# Patient Record
Sex: FEMALE | Race: WHITE | NOT HISPANIC OR LATINO | Employment: UNEMPLOYED | URBAN - METROPOLITAN AREA
[De-identification: names, ages, dates, MRNs, and addresses within clinical notes are randomized per-mention and may not be internally consistent; named-entity substitution may affect disease eponyms.]

---

## 2023-03-08 LAB
ANION GAP IN SER/PLAS: 13 MMOL/L (ref 10–20)
CALCIUM (MG/DL) IN SER/PLAS: 9.1 MG/DL (ref 8.6–10.6)
CARBON DIOXIDE, TOTAL (MMOL/L) IN SER/PLAS: 27 MMOL/L (ref 21–32)
CHLORIDE (MMOL/L) IN SER/PLAS: 103 MMOL/L (ref 98–107)
CREATININE (MG/DL) IN SER/PLAS: 0.76 MG/DL (ref 0.5–1.05)
ERYTHROCYTE DISTRIBUTION WIDTH (RATIO) BY AUTOMATED COUNT: 12.7 % (ref 11.5–14.5)
ERYTHROCYTE MEAN CORPUSCULAR HEMOGLOBIN CONCENTRATION (G/DL) BY AUTOMATED: 32.6 G/DL (ref 32–36)
ERYTHROCYTE MEAN CORPUSCULAR VOLUME (FL) BY AUTOMATED COUNT: 89 FL (ref 80–100)
ERYTHROCYTES (10*6/UL) IN BLOOD BY AUTOMATED COUNT: 4.36 X10E12/L (ref 4–5.2)
GFR FEMALE: >90 ML/MIN/1.73M2
GLUCOSE (MG/DL) IN SER/PLAS: 95 MG/DL (ref 74–99)
HEMATOCRIT (%) IN BLOOD BY AUTOMATED COUNT: 38.6 % (ref 36–46)
HEMOGLOBIN (G/DL) IN BLOOD: 12.6 G/DL (ref 12–16)
LEUKOCYTES (10*3/UL) IN BLOOD BY AUTOMATED COUNT: 3.2 X10E9/L (ref 4.4–11.3)
NRBC (PER 100 WBCS) BY AUTOMATED COUNT: 0 /100 WBC (ref 0–0)
PLATELETS (10*3/UL) IN BLOOD AUTOMATED COUNT: 175 X10E9/L (ref 150–450)
POTASSIUM (MMOL/L) IN SER/PLAS: 4 MMOL/L (ref 3.5–5.3)
SODIUM (MMOL/L) IN SER/PLAS: 139 MMOL/L (ref 136–145)
UREA NITROGEN (MG/DL) IN SER/PLAS: 12 MG/DL (ref 6–23)

## 2023-04-13 ENCOUNTER — OFFICE VISIT (OUTPATIENT)
Dept: PRIMARY CARE | Facility: CLINIC | Age: 33
End: 2023-04-13
Payer: COMMERCIAL

## 2023-04-13 VITALS
BODY MASS INDEX: 25.06 KG/M2 | TEMPERATURE: 97.5 F | OXYGEN SATURATION: 98 % | DIASTOLIC BLOOD PRESSURE: 74 MMHG | HEIGHT: 71 IN | WEIGHT: 179 LBS | SYSTOLIC BLOOD PRESSURE: 114 MMHG | HEART RATE: 69 BPM

## 2023-04-13 DIAGNOSIS — M89.9 LESION OF BONE OF WRIST: Primary | ICD-10-CM

## 2023-04-13 PROBLEM — C53.9 PRIMARY CERVICAL CANCER (MULTI): Status: ACTIVE | Noted: 2022-04-12

## 2023-04-13 PROBLEM — D64.9 ANEMIA: Status: ACTIVE | Noted: 2023-04-13

## 2023-04-13 PROCEDURE — 99213 OFFICE O/P EST LOW 20 MIN: CPT | Performed by: INTERNAL MEDICINE

## 2023-04-13 PROCEDURE — 1036F TOBACCO NON-USER: CPT | Performed by: INTERNAL MEDICINE

## 2023-04-13 RX ORDER — NORETHINDRONE ACETATE AND ETHINYL ESTRADIOL, AND FERROUS FUMARATE 1.5-30(21)
1 KIT ORAL DAILY
COMMUNITY
End: 2024-04-11 | Stop reason: ALTCHOICE

## 2023-04-13 NOTE — PROGRESS NOTES
"The patient is here for: lump on left wrist. No pain.    I have reviewed existing histories, notes, past medical history, surgical history, social history, family history, med list, allergy list, and immunization, and updated if applicable.    Patient's PCP is: Josh Lucero MD    HPI:     For several weeks, noted hard bump on left wrist. No pain.  Not changed.    Additional concerns and ROS:   None      above pcp is her gynecology oncologist. finished tx (chemo and radiation therapy), for cervical cancer in July.   feeling well  did feel really tired during treatment.  some anemia noted.    not vegetarian.  she does not smoke or drink  she stays home.  her  is a retinal specialist at     Physical exam:  /74   Pulse 69   Temp 36.4 °C (97.5 °F)   Ht 1.791 m (5' 10.5\")   Wt 81.2 kg (179 lb)   SpO2 98%   BMI 25.32 kg/m²     Jorge Luis eft distal radius bone, there is a firm cyst/bony structure, no pain or erythematous. PPP  No focal neurologic deficit      Assessments/orders:   1. Lesion of bone of wrist   Firm nodule over the bone, ?ganglion cyst vs bone lesion?  - XR wrist left 3+ views; Future  - XR wrist left 3+ views      Plan/discussion and follow up:      Follow up prn           "

## 2023-04-25 ENCOUNTER — TELEPHONE (OUTPATIENT)
Dept: PRIMARY CARE | Facility: CLINIC | Age: 33
End: 2023-04-25
Payer: COMMERCIAL

## 2023-04-25 DIAGNOSIS — M67.432 GANGLION CYST OF DORSUM OF LEFT WRIST: Primary | ICD-10-CM

## 2023-10-06 NOTE — PROGRESS NOTES
".Patient ID: Clotilde Connor is a 33 y.o. female.  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   4 cm lesion with full thickness cervical involvement and early parametrial invasion.    PET/CT performed mid-cranium to midthighs on 3/31/2022:  No extracervical abnormal findings.        Underwent chemo-radiation therapy:  Dr. Rushing at the [facility]:  ChemoRT.  EBRT.  4500 cGy 25 fractions.  Dr. Erasmo Howe, Brachytherapy.  4 fractions, 2800 cGy.  Completed 7/8/2022.    No ovarian transposition.    Did have embryos harvested in [place] prior to chemoRT.    Treatment history:  3/7/2022 Excisional procedure  Final pathology:  2.6 x 2.3 x 2.0 cm specimen submitted.  Invasive carcinoma diagnosed,  most consistent with adenosquamous carcinoma.  Tumor measures 2.6 cm grossly, with \"circumferential involvement of the cone specimen\".  Depth of invasion is at least 8 mm.  Perineural invasion noted as were findings suspicious for LVSI.    ECC:  Non-diagnostic.  3/31/2022 PET/CT No extracervical abnormal findings.      Reported \"indistinct 3.6  x 4.2 cm enhancement and thickening with associated FDG uptake.    ChemoRT.  EBRT.  4500 cGy 25 fractions. in [place].   Completed 6/2022  - 7/8/2022  HDR BT 4 fractions, 2800 cGy  3/13/23 EUA cervical biopsy, ECC radiation changes, no evidence of malignancy  PMHx: None  PSHx: None      Objective    There were no vitals taken for this visit.     Interval history:  Patient is a 33 year old female with stage IIB adenosquamous carcinoma of the cervix s/p ChemoRT,  EBRT, 4500 cGy 25 fractions and s/p 4 fractions brachytherapy.  Completed 7/8/2022.  3/13/23 EUA cervical biopsy, ECC radiation changes, no evidence of malignancy.  Last pap 7/2023 was negative, HPV-.  Patient states she has been taking OCPs, having periods monthly, still not quite regular intervals.  Patient states she has been working out daily, " cycling and hiking.  Patient states the last two months she will have random shortness of breath, not necessarily related to exercise.  Patient is currently sexually active, has some dryness and bleeding after sex.  Denies bowel or bladder issues.           Physical Exam:    Constitutional: Doing well. MELISSA  Eyes: PERRL  ENMT: Moist mucus membranes  Head/Neck: Supple. Symmetrical  Cardiovascular: Regular, rate and rhythm. 2+ equal pulses of the extremities  Respiratory/Thorax: CTA. RRR. Chest rise symmetrical.  Gastrointestinal: Non-distended, soft, non-tender  Genitourinary: Cervical borders obliterated, radiation atrophy changes to tissue, no lesions noted.   Musculoskeletal: ROM intact, no joint swelling, normal strength  Extremities: No edema  Neurological: Alert and oriented x 3. Pleasant and cooperative.  Lymphatic: No lymphadenopathy. No lymphedema  Psychological: Appropriate mood and behavior  Skin: Warm and dry, no lesions, no rashes    A complete review of systems was performed and all systems were normal except what is noted in the interval history.          Assessment/Plan   Patient is a 33 year old female with stage IIB adenosquamous carcinoma of the cervix s/p ChemoRT,  EBRT, 4500 cGy 25 fractions and s/p 4 fractions brachytherapy.  Completed 7/8/2022.  Radiation atrophy.         Plan:     RX Estrace sent  Refill OCPS as needed  F/U in 3 months or as needed  Call PCP if dyspnea episodes occur more often

## 2023-10-07 PROBLEM — R22.32 MASS OF LEFT WRIST: Status: ACTIVE | Noted: 2023-10-07

## 2023-10-10 ENCOUNTER — OFFICE VISIT (OUTPATIENT)
Dept: GYNECOLOGIC ONCOLOGY | Facility: CLINIC | Age: 33
End: 2023-10-10
Payer: COMMERCIAL

## 2023-10-10 VITALS
HEART RATE: 78 BPM | DIASTOLIC BLOOD PRESSURE: 76 MMHG | TEMPERATURE: 97.3 F | SYSTOLIC BLOOD PRESSURE: 121 MMHG | BODY MASS INDEX: 26.54 KG/M2 | RESPIRATION RATE: 16 BRPM | WEIGHT: 187.61 LBS | OXYGEN SATURATION: 98 %

## 2023-10-10 DIAGNOSIS — C53.9 MALIGNANT NEOPLASM OF CERVIX, UNSPECIFIED SITE (MULTI): ICD-10-CM

## 2023-10-10 DIAGNOSIS — N93.0 PCB (POST COITAL BLEEDING): Primary | ICD-10-CM

## 2023-10-10 DIAGNOSIS — T66.XXXA RADIATION INJURY, INITIAL ENCOUNTER: ICD-10-CM

## 2023-10-10 PROCEDURE — 99214 OFFICE O/P EST MOD 30 MIN: CPT | Performed by: NURSE PRACTITIONER

## 2023-10-10 PROCEDURE — 1036F TOBACCO NON-USER: CPT | Performed by: NURSE PRACTITIONER

## 2023-10-10 RX ORDER — ESTRADIOL 0.1 MG/G
CREAM VAGINAL
Qty: 42.5 G | Refills: 3 | Status: SHIPPED
Start: 2023-10-10 | End: 2023-11-02 | Stop reason: ALTCHOICE

## 2023-10-10 ASSESSMENT — PAIN SCALES - GENERAL: PAINLEVEL: 0-NO PAIN

## 2023-10-10 ASSESSMENT — PATIENT HEALTH QUESTIONNAIRE - PHQ9
SUM OF ALL RESPONSES TO PHQ QUESTIONS 1-9: 0
1. LITTLE INTEREST OR PLEASURE IN DOING THINGS: 0
SUM OF ALL RESPONSES TO PHQ9 QUESTIONS 1 AND 2: 0
5. POOR APPETITE OR OVEREATING: NOT AT ALL
8. MOVING OR SPEAKING SO SLOWLY THAT OTHER PEOPLE COULD HAVE NOTICED. OR THE OPPOSITE, BEING SO FIGETY OR RESTLESS THAT YOU HAVE BEEN MOVING AROUND A LOT MORE THAN USUAL: NOT AT ALL
8. MOVING OR SPEAKING SO SLOWLY THAT OTHER PEOPLE COULD HAVE NOTICED. OR THE OPPOSITE, BEING SO FIGETY OR RESTLESS THAT YOU HAVE BEEN MOVING AROUND A LOT MORE THAN USUAL: NOT AT ALL
1. LITTLE INTEREST OR PLEASURE IN DOING THINGS: NOT AT ALL
2. FEELING DOWN, DEPRESSED OR HOPELESS: NOT AT ALL
9. THOUGHTS THAT YOU WOULD BE BETTER OFF DEAD, OR OF HURTING YOURSELF: 0
10. IF YOU CHECKED OFF ANY PROBLEMS, HOW DIFFICULT HAVE THESE PROBLEMS MADE IT FOR YOU TO DO YOUR WORK, TAKE CARE OF THINGS AT HOME, OR GET ALONG WITH OTHER PEOPLE: NOT DIFFICULT AT ALL
7. TROUBLE CONCENTRATING ON THINGS, SUCH AS READING THE NEWSPAPER OR WATCHING TELEVISION: NOT AT ALL
7. TROUBLE CONCENTRATING ON THINGS, SUCH AS READING THE NEWSPAPER OR WATCHING TELEVISION: NOT AT ALL
SUM OF ALL RESPONSES TO PHQ QUESTIONS 1-9: 0
6. FEELING BAD ABOUT YOURSELF - OR THAT YOU ARE A FAILURE OR HAVE LET YOURSELF OR YOUR FAMILY DOWN: NOT AT ALL
3. TROUBLE FALLING OR STAYING ASLEEP OR SLEEPING TOO MUCH: NOT AT ALL
3. TROUBLE FALLING OR STAYING ASLEEP OR SLEEPING TOO MUCH: NOT AT ALL
1. LITTLE INTEREST OR PLEASURE IN DOING THINGS: NOT AT ALL
4. FEELING TIRED OR HAVING LITTLE ENERGY: 0
4. FEELING TIRED OR HAVING LITTLE ENERGY: NOT AT ALL
9. THOUGHTS THAT YOU WOULD BE BETTER OFF DEAD, OR OF HURTING YOURSELF: NOT AT ALL
10. IF YOU CHECKED OFF ANY PROBLEMS, HOW DIFFICULT HAVE THESE PROBLEMS MADE IT FOR YOU TO DO YOUR WORK, TAKE CARE OF THINGS AT HOME, OR GET ALONG WITH OTHER PEOPLE: NOT DIFFICULT AT ALL
2. FEELING DOWN, DEPRESSED, IRRITABLE, OR HOPELESS: NOT AT ALL
6. FEELING BAD ABOUT YOURSELF - OR THAT YOU ARE A FAILURE OR HAVE LET YOURSELF OR YOUR FAMILY DOWN: NOT AT ALL
8. MOVING OR SPEAKING SO SLOWLY THAT OTHER PEOPLE COULD HAVE NOTICED. OR THE OPPOSITE, BEING SO FIGETY OR RESTLESS THAT YOU HAVE BEEN MOVING AROUND A LOT MORE THAN USUAL: 0
2. FEELING DOWN, DEPRESSED, IRRITABLE, OR HOPELESS: 0
6. FEELING BAD ABOUT YOURSELF - OR THAT YOU ARE A FAILURE OR HAVE LET YOURSELF OR YOUR FAMILY DOWN: 0
5. POOR APPETITE OR OVEREATING: NOT AT ALL
5. POOR APPETITE OR OVEREATING: 0
3. TROUBLE FALLING OR STAYING ASLEEP OR SLEEPING TOO MUCH: 0
1. LITTLE INTEREST OR PLEASURE IN DOING THINGS: NOT AT ALL
7. TROUBLE CONCENTRATING ON THINGS, SUCH AS READING THE NEWSPAPER OR WATCHING TELEVISION: 0
4. FEELING TIRED OR HAVING LITTLE ENERGY: NOT AT ALL
9. THOUGHTS THAT YOU WOULD BE BETTER OFF DEAD, OR OF HURTING YOURSELF: NOT AT ALL
2. FEELING DOWN, DEPRESSED OR HOPELESS: NOT AT ALL

## 2023-10-10 ASSESSMENT — ENCOUNTER SYMPTOMS
DEPRESSION: 0
LOSS OF SENSATION IN FEET: 0
OCCASIONAL FEELINGS OF UNSTEADINESS: 0

## 2023-10-10 ASSESSMENT — COLUMBIA-SUICIDE SEVERITY RATING SCALE - C-SSRS
6. HAVE YOU EVER DONE ANYTHING, STARTED TO DO ANYTHING, OR PREPARED TO DO ANYTHING TO END YOUR LIFE?: NO
2. HAVE YOU ACTUALLY HAD ANY THOUGHTS OF KILLING YOURSELF?: NO

## 2023-10-11 ENCOUNTER — DOCUMENTATION (OUTPATIENT)
Dept: GYNECOLOGIC ONCOLOGY | Facility: CLINIC | Age: 33
End: 2023-10-11
Payer: COMMERCIAL

## 2023-11-02 ENCOUNTER — CONSULT (OUTPATIENT)
Dept: ENDOCRINOLOGY | Facility: CLINIC | Age: 33
End: 2023-11-02
Payer: COMMERCIAL

## 2023-11-02 VITALS
DIASTOLIC BLOOD PRESSURE: 85 MMHG | HEIGHT: 70 IN | HEART RATE: 80 BPM | SYSTOLIC BLOOD PRESSURE: 130 MMHG | BODY MASS INDEX: 27.49 KG/M2 | WEIGHT: 192 LBS

## 2023-11-02 DIAGNOSIS — Z31.41 FERTILITY TESTING: ICD-10-CM

## 2023-11-02 DIAGNOSIS — Z12.4 CERVICAL CANCER SCREENING: ICD-10-CM

## 2023-11-02 DIAGNOSIS — Z31.69 ENCOUNTER FOR PRECONCEPTION CONSULTATION: Primary | ICD-10-CM

## 2023-11-02 PROCEDURE — 99215 OFFICE O/P EST HI 40 MIN: CPT | Performed by: OBSTETRICS & GYNECOLOGY

## 2023-11-02 ASSESSMENT — LIFESTYLE VARIABLES
HISTORY_ALCOHOL_USE: NO
TOBACCO_USE: NO

## 2023-11-02 ASSESSMENT — PAIN SCALES - GENERAL: PAINLEVEL: 0-NO PAIN

## 2023-11-02 NOTE — PROGRESS NOTES
In Person    NEW FERTILITY PATIENT VISIT    Referred by:    Accompanied today by: Who is accompanying you to your visit:: Aftab Connor is a 33 y.o.  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  Completed 2022.  Currently in remission    IVF cycle in [place]- 24 eggs retrieved, 4 blastocysts frozen and PGT tested --> 1 euploid blastocyst frozen    Prior Labs  FSH 2.7  LH 0.6  E2 <19  -All of the above on OCP     OB Hx     OB History          2    Para   1    Term                AB        Living             SAB        IAB        Ectopic        Multiple        Live Births   1               OB Hx:  2014-  full term, no complications  - Ruptured ectopic, right tube s/p R salpingectomy (conceived with Mirena IUD in place)- had left tubal ligation at that time.  -Both from previous partner, now  from him    MENSTRUAL HISTORY  LMP:  On OCP- - 10/5/2023  Menarche: If applicable, when was your first occurrence of menstruation?: 2023  -Amenorrhea after cessation of treatment until starting OCP in 2022  Contraception: What (if any) type of birth control do you currently use?: Maria Esther Fe  Cycle length: What is the average number of days between menstrual cycles?: 30  Describe your bleeding: Describe your  bleeding:: Light  Dysmenorrhea: Are your menstrual periods painful?: No     ENDOCRINE/INFERTILITY HISTORY  Duration of infertility: If applicable, what is the approximate date you began trying to get pregnant?: Not applicable  Coital Activity/week:    Nipple Discharge: Do you experience any loss of milk or liquid discharge from the breasts?: No  Vision changes: Are you experiencing any vision changes?: No  Headaches: Are you experiencing headaches?: No  Excess hair growth: Are you experiencing persistent or worsening hair growth on the face, breasts or lower abdomen?: No  Excessive hair loss: Are you experiencing loss of hair from your scalp?: No  Acne: Are you experiencing acne?: No  Oily skin: Oily skin?: No  Recent weight change  Weight gain: Are you experiencing any increase in weight?: Yes  Weight loss: Are you experiencing any decrease in weight?: No  Exercise more than 3 times a week: Exercise more than 3 times a week?: Yes      GYN HISTORY   Have you ever been diagnosed with a sexually transmitted disease? Have you ever been diagnosed with a sexually transmitted disease?: No  Please select all that are applicable:    Have you ever had Pelvic Inflammatory Disease? Have you ever had Pelvic Inflammatory Disease?: No  Have you had an abnormal PAP smear? Have you had an abnormal PAP smear?: Yes  Date & Result of last PAP smear: S/p cervical cancer treatment, in remission  Have you ever had an abnormal Mammogram? Have you ever had an abnormal mammogram?: No  Date & result of your last mammogram:    Do you have pelvic pain? Do you have pelvic pain?: No  How many times per week do you have intercourse?    Do you have pain with intercourse? Do you have pain with intercourse?: No  Do you use lubricants with intercourse?    Do you have pain with bowel movements? Do you have pain with bowel movements?: Yes  -Result of radiation treatment          Do you have pain with a full bladder? Do you have pain with a full  bladder?: No      PMH  Past Medical History:   Diagnosis Date    Cervical cancer (CMS/[facility])         MEDICATIONS  Current Outpatient Medications on File Prior to Visit   Medication Sig Dispense Refill    Junel FE 1., 28, 1.5 mg-30 mcg (21)/75 mg (7) tablet Take 1 tablet by mouth once daily. as directed      [DISCONTINUED] estradiol (Estrace) 0.01 % (0.1 mg/gram) vaginal cream Dime to nickel size amount on your finger and apply to directed area.  3 nights a week at bedtime 42.5 g 3     No current facility-administered medications on file prior to visit.        PSH  Past Surgical History:   Procedure Laterality Date    CERVICAL BIOPSY      ECTOPIC PREGNANCY SURGERY      OVARY SURGERY          PSYCH HISTORY  Have you ever been diagnosed with a mental health Issue?: No    Have you ever been hospitalized for a mental health disorder?: No       SOCIAL HISTORY  Social History     Tobacco Use    Smoking status: Never    Smokeless tobacco: Never   Substance Use Topics    Alcohol use: Never    Drug use: Never     Occupation: Your occupation:: Housewife    Have you ever been incarcerated? Have you ever been incarcerated?: No  Do you have a history of domestic violence? Do you have a history of domestic violence?: No  Do you feel safe at home? Do you feel safe at home?: Yes  Do you have a history of any negative sexual experience such as incest or rape? Do you have a history of any negative sexual experience such as incest or rape?: No       PARTNER HISTORY  Partner Name: Partner name:: Aftab Prince  : Partner :: 08/10/84  Occupation: Partner occupation:: Retina Specialist and Ocular Oncolgist  Prior fertility history: Partner Prior Fertility History:: Healthy  PMH:   None  PSH:   None  Smoking:Partner: Recent or current tobacco use: No  Alcohol Use: Partner: Recent or current alcohol use: No  Drug Use: Partner: Recent or current drug use: No  Medications:    Injuries: Partner: Any history of surgeries or injuries in  "the reproductive area?: No  STD: Have you ever been diagnosed with a sexually transmitted disease?: No  Please select all that are applicable:    SA: Prior Semen Analysis completed?: No    SA Results:   Had normal SA as part of IVF treatment in [place]    FAMILY HISTORY  Family History   Problem Relation Name Age of Onset    Diabetes Father      Hypertension Father      Breast cancer Paternal Grandmother         CANCER HISTORY    Breast: Breast Cancer: Please answer Yes, No or Unsure. If Yes, please, identify which family member.: Paternal Grandmother  Ovarian:    Colon:    Endometrial:      FAMILY VTE HISTORY  Family History of Blood Clots:  None    GENETIC HISTORY  Ethnic Background  Patient: Ethnic Background Patient:: White  Partner: Ethnic Background Partner:: [place]  Genetic Disease in Family  Patient: Patient: Defects and genetic syndromes? Please answer Yes, No or Unsure: No  Partner: Partner: Defects and genetic syndromes? Please answer Yes, No or Unsure: No  Birth Defects in Family  Patient: Patient: Birth defects? Please answer Yes, No or Unsure: No  Partner: Partner: Birth defects? Please answer Yes, No or Unsure: No  Genetic screening performed previously:   Believes she had screening done as part of IVF treatment- cannot remember which company and was told normal     BMI:   BMI Readings from Last 1 Encounters:   23 27.55 kg/m²     VITALS:  /85 (BP Location: Right arm, Patient Position: Sitting, BP Cuff Size: Adult)   Pulse 80   Ht 1.778 m (5' 10\")   Wt 87.1 kg (192 lb)   LMP 10/05/2023 (Exact Date)   BMI 27.55 kg/m²     ASSESSMENT   33 y.o.  female with secondary  infertility (tubal factor s/p Right salpingectomy, L tubal ligation), history of cervical cancer s/p radiation, desires FET with euploid embryo currently stored at [facility].  Currently on OCP with monthly menses (no assessment of menopausal status)  Partner SA: Normal    COUNSELING  We discussed causes of infertility " including hormonal, egg quality issues, structural problems such as endometriosis, adhesions, or tubal problems, uterine factors such as polyps or fibroids, and sperm issues. Reviewed evaluation of such as well. We discussed various methods for achieving pregnancy in some detail including, ovulation induction, insemination, superovulation and IVF.    Reviewed  history of radiation and impacts on future fertility in detail.  We reviewed that patient is having monthly withdrawal bleeding on OCP, however, cannot assess ovarian status while on OCP  Risks of pregnancy after radiation include both inability of uterine lining to be receptive to implantation and inability of uterus to carry pregnancy to term without significant complications.  We reviewed gestational carrier would be safest option however this is not an option for couple given their [employer] beliefs.  We discussed need to involve oncology, MFM, and ethics in shared decision making to determine if we can proceed with FET.       Routine Testing  Fertility Center  STDs Within 1 year   Genetic carrier Waiver/Completed   T&S Within 1 year   AMH Within 1 year   TSH Within 1 year   Rubella/Varicella Within 5 years     BMI Testing  Fertility Center  CBC Within 1 year   CMP Within 1 year   HgbA1c Within 1 year   Mag, Phos, Vit D <18 Within 1 year   MFM > 40  REQ   Wt loss consult > 40 OPT     PLAN  Orders Placed This Encounter   Procedures    US pelvis transvaginal    Referral to Maternal Fetal Medicine    Referral to Gynecologic Oncology       GENETIC SCREENING PATIENT  May have been done previously, not ordered today    PARTNER  Yes Semen Analysis: Completed previous  NA- See above    FOLLOW UP   Consults: MFM consult: indication:Preconception consult for pelvic radiation and GYN consult for clearance to conceive  Chart to primary nurse for care coordination and patient check list/education  Enroll in Engaged MD  Take prenatal vitamins, vitamin D 2000  IUs daily  Discussed that PAP and mammogram must be updated if appropriate based on age and clinical history and results received before treatment can begin  Discussed that treatment cannot proceed until checklist items are complete   6 week follow up with MD  Additional testing for BMI < 18 or > 40:  None    Preliminary plan-  TVUS to assess lining on OCP  MFM, ONC consults and (after above) ethics meeting to determine if we can proceed with FET  If we do proceed- will need diagnostic hysteroscopy as well as swtich patient to HRT to determine lining thickness, consider vitamin E and pentoxifylline x 3 months prior    Bev Mayer  11/02/2023  2:48 PM

## 2023-11-02 NOTE — PATIENT INSTRUCTIONS
33 y.o.  female with secondary  infertility (tubal factor s/p Right salpingectomy, L tubal ligation), history of cervical cancer s/p radiation, desires FET with euploid embryo currently stored at "[facility]  Currently on OCP with monthly menses (no assessment of menopausal status)  Partner SA: Normal    COUNSELING  We discussed causes of infertility including hormonal, egg quality issues, structural problems such as endometriosis, adhesions, or tubal problems, uterine factors such as polyps or fibroids, and sperm issues. Reviewed evaluation of such as well. We discussed various methods for achieving pregnancy in some detail including, ovulation induction, insemination, superovulation and IVF.    Reviewed  history of radiation and impacts on future fertility in detail.  We reviewed that patient is having monthly withdrawal bleeding on OCP, however, cannot assess ovarian status while on OCP  Risks of pregnancy after radiation include both inability of uterine lining to be receptive to implantation and inability of uterus to carry pregnancy to term without significant complications.  We reviewed gestational carrier would be safest option however this is not an option for couple given their Confucianism Jehovah's witness beliefs.  We discussed need to involve oncology, MFM, and ethics in shared decision making to determine if we can proceed with FET.       Routine Testing  Fertility Center  STDs Within 1 year   Genetic carrier Waiver/Completed   T&S Within 1 year   AMH Within 1 year   TSH Within 1 year   Rubella/Varicella Within 5 years     BMI Testing  Fertility Center  CBC Within 1 year   CMP Within 1 year   HgbA1c Within 1 year   Mag, Phos, Vit D <18 Within 1 year   MFM > 40  REQ   Wt loss consult > 40 OPT     PLAN  Orders Placed This Encounter   Procedures    US pelvis transvaginal    Referral to Maternal Fetal Medicine    Referral to Gynecologic Oncology       GENETIC SCREENING PATIENT  May have been done previously,  not ordered today    PARTNER  Yes Semen Analysis: Completed previous  NA- See above    FOLLOW UP   Consults: MFM consult: indication:Preconception consult for pelvic radiation and GYN consult for clearance to conceive  Chart to primary nurse for care coordination and patient check list/education  Enroll in Engaged MD  Take prenatal vitamins, vitamin D 2000 IUs daily  Discussed that PAP and mammogram must be updated if appropriate based on age and clinical history and results received before treatment can begin  Discussed that treatment cannot proceed until checklist items are complete   6 week follow up with MD  Additional testing for BMI < 18 or > 40: None    Preliminary plan-  TVUS to assess lining on OCP  MFM, ONC consults and (after above) ethics meeting to determine if we can proceed with FET  If we do proceed- will need diagnostic hysteroscopy as well as swtich patient to HRT to determine lining thickness, consider vitamin E and pentoxifylline x 3 months prior    Bev Mayer  11/02/2023  2:48 PM

## 2023-11-03 DIAGNOSIS — Z01.83 ENCOUNTER FOR RH BLOOD TYPING: ICD-10-CM

## 2023-11-03 DIAGNOSIS — N97.9 FEMALE INFERTILITY: ICD-10-CM

## 2023-11-03 DIAGNOSIS — Z11.59 ENCOUNTER FOR SCREENING FOR OTHER VIRAL DISEASES: ICD-10-CM

## 2023-11-03 DIAGNOSIS — Z11.3 SCREENING FOR STDS (SEXUALLY TRANSMITTED DISEASES): ICD-10-CM

## 2023-11-03 DIAGNOSIS — Z13.29 SCREENING FOR THYROID DISORDER: Primary | ICD-10-CM

## 2023-11-03 DIAGNOSIS — Z31.83 ENCOUNTER FOR ASSISTED REPRODUCTIVE FERTILITY CYCLE: ICD-10-CM

## 2023-11-03 NOTE — PROGRESS NOTES
[place] Interval FET Checklist    Age: 33 y.o.    OB Hx:  2014-  full term, no complications  - Ruptured ectopic, right tube s/p R salpingectomy (conceived with Mirena IUD in place)- had left tubal ligation at that time.  -Both from previous partner, now  from him  Provider: Bev Mayer MD  Primary RN: Kathrin Garcia  Reasons for Treatment: secondary  infertility (tubal factor s/p Right salpingectomy, L tubal ligation), history of cervical cancer s/p radiation   Last BMI  23 : 27.55 kg/m²       Past Medical History:   Diagnosis Date    Cervical cancer (CMS/HCC)      Ectopic Risk: Y    Date Done Consultation Results/Comments   23 Medication Protocol Will start HRT with estrace patch 100 mcg and Prometrium 200 mg daily 12 days per months.  Will stop OCP.  Recommend Pentoxifylline 400 mg BID and Vitamin E 400 mg BID for three months along with HRT    FET Treatment Form Enroll in EngagedMD: Yes (Kathrin Garcia RN)  Received and in chart: {KRYSTAL Yes (Name):33220}    IVF Consent Form Enroll in EngagedMD: Yes (Kathrin Garcia RN)  Received and in chart: {KRYSTAL Yes (Name):21887}   N/A  [place] (in) Form Enroll in EngagedMD: N/A  Received and in chart: N/A    [employer] Transfer Authorization Form Enroll in EngagedMD: Yes (Kathrin Garcia RN)  Received and in chart: {KRYSTAL Yes (Name) N/A:48853}   N/A Embryo Ship In N/A   23 Procedure Order Placed {KRYSTAL Yes, No, N/A:00929}- Pended    *** Genetic Carrier Screening Clearance {KRYSTAL YES/NO:01511}   23 MFM Consult Okay to proceed? Yes  Risk of cervical incompetence:   - The literature suggests that one cold knife cone does not increase the risk of cervical insufficiency,  delivery and pregnancy loss, but her CKC was extensive due to underlying diagnosis.   - Would recommend a cerclage, vaginal vs. Abdominal. Would advocate for vaginal if possible due to less morbidity and possibility for a vaginal delivery but  this may not be feasible if there is no cervical tissue in the vagina.   - If abdominal cerclage is pursued would plan to place BEFORE transfer.   - She has an appointment with gyn onc ([facility]) soon, I reached out to Dr. Wilkerson to obtain her input after she does an exam at this visit.    N/A Psych Consult Okay to proceed? N/A   N/A Genetics Consult Okay to proceed? {KRYSTAL Yes, No, N/A:91684}   12/5/23 ONC ONC (Dr. Wilkerson) counseled extensively regarding risks of pregnancy given radiation treatment- does not recommend pregnancy  Discussed possible hysterectomy in pregnancy and associated risks  Agreed to get MRI to evaluate pelvic anatomy  Follow-up for further discussion after MRI    Date Done Female Labs Results/Comments    T&S (Q 1 Year) ABO:   Rh:   Antibody:     Hep B sAg     Hep C AB     HIV     Syphilis     GC/CT GC:  CT:    Rubella (Q 5 Years)     Varicella (Q 5 Years)     TSH    N/A HgbA1C N/A         11/14/23 Uterine Cavity Eval HSG: N/A  SIS: N/A  Hyster: (N/A)    Pelvic: Fertility Testing  Impression  =========  Normal appearing uterus. Small ovaries noted bilaterally.  Follow-up  MRI:                      7/11/23 Pap Smear (Q 5 Years) Saw [facility] specialist in [place] in 12/2021- recommended to get pap smear as part of workup  Pap smear demonstrated cervical cancer- adenosquamous cancer- Stage IIB  Underwent fertility preservation prior to cervical cancer therapy- froze 1 blastocyst  Now s/p ChemoRT,  EBRT, 4500 cGy 25 fractions and s/p 4 fractions brachytherapy.  Completed 7/8/2022.  Currently in remission  FINAL CYTOLOGICAL INTERPRETATION         A.  THINPREP PAP CERVICAL:               Specimen adequacy:        SATISFACTORY FOR EVALUATION.        Quality Indicator: Endocervical/transformation zone component is present.        Quality Indicator: Partially obscuring blood.               General Categorization:        NEGATIVE FOR INTRAEPITHELIAL LESION OR MALIGNANCY.                             HIGH  RISK HPV TEST RESULT:                        HPV GENOTYPE  16                      NEGATIVE        HPV GENOTYPE  18                      NEGATIVE        HPV GENOTYPE  OTHER             NEGATIVE               Reference Range: Negative   HPV: Negative    N/A Mammogram ( > 40) (Q 1 Year) N/A               Date Done Miscellaneous Results/Comments   N/A BMI Checklist  BMI > 40 or < 18 Added to chart:   No   N/A >= 45 Checklist  Added to chart:   No   **Does not need to be completed prior to placing on IVF calendar**

## 2023-11-06 ENCOUNTER — HOSPITAL ENCOUNTER (OUTPATIENT)
Dept: RADIOLOGY | Facility: HOSPITAL | Age: 33
Discharge: HOME | End: 2023-11-06
Payer: COMMERCIAL

## 2023-11-06 DIAGNOSIS — Z31.41 FERTILITY TESTING: ICD-10-CM

## 2023-11-08 ENCOUNTER — ANCILLARY PROCEDURE (OUTPATIENT)
Dept: ENDOCRINOLOGY | Facility: CLINIC | Age: 33
End: 2023-11-08
Payer: COMMERCIAL

## 2023-11-08 PROCEDURE — 76830 TRANSVAGINAL US NON-OB: CPT | Performed by: OBSTETRICS & GYNECOLOGY

## 2023-11-08 PROCEDURE — 76856 US EXAM PELVIC COMPLETE: CPT | Performed by: OBSTETRICS & GYNECOLOGY

## 2023-11-08 PROCEDURE — 76830 TRANSVAGINAL US NON-OB: CPT

## 2023-11-13 DIAGNOSIS — Z13.71 SCREENING FOR GENETIC DISEASE CARRIER STATUS: ICD-10-CM

## 2023-11-29 ENCOUNTER — INITIAL PRENATAL (OUTPATIENT)
Dept: MATERNAL FETAL MEDICINE | Facility: CLINIC | Age: 33
End: 2023-11-29
Payer: COMMERCIAL

## 2023-11-29 ENCOUNTER — APPOINTMENT (OUTPATIENT)
Dept: MATERNAL FETAL MEDICINE | Facility: CLINIC | Age: 33
End: 2023-11-29
Payer: COMMERCIAL

## 2023-11-29 VITALS — BODY MASS INDEX: 26.83 KG/M2 | WEIGHT: 187 LBS | SYSTOLIC BLOOD PRESSURE: 134 MMHG | DIASTOLIC BLOOD PRESSURE: 83 MMHG

## 2023-11-29 DIAGNOSIS — Z85.41 HISTORY OF CERVICAL CANCER: ICD-10-CM

## 2023-11-29 DIAGNOSIS — Z31.69 ENCOUNTER FOR PRECONCEPTION CONSULTATION: Primary | ICD-10-CM

## 2023-11-29 DIAGNOSIS — Z92.3 PERSONAL HISTORY OF RADIATION THERAPY: ICD-10-CM

## 2023-11-29 PROCEDURE — 99214 OFFICE O/P EST MOD 30 MIN: CPT | Performed by: OBSTETRICS & GYNECOLOGY

## 2023-11-29 NOTE — PROGRESS NOTES
Clotilde Connor is a 33 y.o.  presenting for an MFM preconception consultation [facility] due to a history of cervical cancer with a CKC, chemo, EBRT and brachytherapy. She is without complaints today. ROS is negative.      Issues:   Stage IIB cervical cancer diagnosed in 3/2022- had excisional procedure followed by 25 fractions of EBRT, chemo and brachytherapy. She had one blast frozen before treatment.     OBHx: full term , ruptured ectopic (had salpingectomy bilaterally by choice)  GynHx: as above  MedHx: denies  SurgHx: CKC, ectopic  Meds: HRT  All: shrimp  FamHx: noncontributory   SocHx: no toxic habits     O: Visit Vitals  /83   Wt 84.8 kg (187 lb)   LMP 10/05/2023 (Exact Date)   BMI 26.83 kg/m²   OB Status Having periods   Smoking Status Never   BSA 2.05 m²      Physical exam deferred for this consultative visit.     A/P: 32yo  presenting for a preconception consultation. We discussed the following:     History of IIB Cervical Cancer with chemo, EBRT, brachytherapy  Discussed in detail two major concerns after her treatment: cervical incompetence and the affects of pelvic radiation on endometrial integrity and blood flow the uterus.     Risk of cervical incompetence:   - The literature suggests that one cold knife cone does not increase the risk of cervical insufficiency,  delivery and pregnancy loss, but her CKC was extensive due to underlying diagnosis.   - Would recommend a cerclage, vaginal vs. Abdominal. Would advocate for vaginal if possible due to less morbidity and possibility for a vaginal delivery but this may not be feasible if there is no cervical tissue in the vagina.   - If abdominal cerclage is pursued would plan to place BEFORE transfer.   - She has an appointment with gyn onc ([facility]) soon, I reached out to Dr. Wilkerson to obtain her input after she does an exam at this visit.     Risk of pelvic radiation:  -  Discussed at length the risks of pelvic radiation including risk of pregnancy loss, early severe FGR, growth restriction. These risks are significant but difficult to quantify.   - Reviewed the risks of prematurity and poor obstetric outcome even with maximal surveillance.   - Also discussed the risk that pregnancy will not be achieved due to the quality of her endometrial lining.     Finally, I discussed with the patient and her spouse that she does not have an overt contraindication to pregnancy or assisted reproduction but the risk of maternal and fetal morbidity and mortality are significant. She will require [facility] care in pregnancy with frequent visits and the possibility of a prolonged hospitalization.     Thank you for allowing me to participate in the care of your patient. Please do not hesitate to contact me with any further questions.     Sherine Chino MD  Maternal Fetal Medicine  Director of Fetal Intervention

## 2023-12-05 ENCOUNTER — OFFICE VISIT (OUTPATIENT)
Dept: GYNECOLOGIC ONCOLOGY | Facility: HOSPITAL | Age: 33
End: 2023-12-05
Payer: COMMERCIAL

## 2023-12-05 VITALS
HEART RATE: 82 BPM | WEIGHT: 189.7 LBS | DIASTOLIC BLOOD PRESSURE: 82 MMHG | TEMPERATURE: 97.5 F | SYSTOLIC BLOOD PRESSURE: 138 MMHG | BODY MASS INDEX: 27.22 KG/M2

## 2023-12-05 DIAGNOSIS — Z12.4 CERVICAL CANCER SCREENING: ICD-10-CM

## 2023-12-05 DIAGNOSIS — Z31.69 ENCOUNTER FOR PRECONCEPTION CONSULTATION: ICD-10-CM

## 2023-12-05 DIAGNOSIS — C53.0 MALIGNANT NEOPLASM OF ENDOCERVIX (MULTI): Primary | ICD-10-CM

## 2023-12-05 PROCEDURE — 1036F TOBACCO NON-USER: CPT | Performed by: OBSTETRICS & GYNECOLOGY

## 2023-12-05 PROCEDURE — 99214 OFFICE O/P EST MOD 30 MIN: CPT | Performed by: OBSTETRICS & GYNECOLOGY

## 2023-12-05 NOTE — PROGRESS NOTES
"Patient ID: Clotilde Connor is a 33 y.o. female.  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   4 cm lesion with full thickness cervical involvement and early parametrial invasion.    PET/CT performed mid-cranium to midthighs on 3/31/2022:  No extracervical abnormal findings.        Underwent chemo-radiation therapy:  Dr. Rushing at the [facility]:  ChemoRT.  EBRT.  4500 cGy 25 fractions.  Dr. Erasmo Howe, Brachytherapy.  4 fractions, 2800 cGy.  Completed 7/8/2022.    No ovarian transposition.    Did have embryos harvested in [place] prior to chemoRT.    Treatment history:  3/7/2022 Excisional procedure  Final pathology:  2.6 x 2.3 x 2.0 cm specimen submitted.  Invasive carcinoma diagnosed,  most consistent with adenosquamous carcinoma.  Tumor measures 2.6 cm grossly, with \"circumferential involvement of the cone specimen\".  Depth of invasion is at least 8 mm.  Perineural invasion noted as were findings suspicious for LVSI.    ECC:  Non-diagnostic.  3/31/2022 PET/CT No extracervical abnormal findings.      Reported \"indistinct 3.6  x 4.2 cm enhancement and thickening with associated FDG uptake.    ChemoRT.  EBRT.  4500 cGy 25 fractions. in [place].   Completed 6/2022  - 7/8/2022  HDR BT 4 fractions, 2800 cGy  3/13/23 EUA cervical biopsy, ECC radiation changes, no evidence of malignancy  PMHx: None  PSHx: None  Interval History:  Patient states she is menstruating currently and it is not too heavy, regular since she stopped taking OCP in August, continues to have cramping but they have improved, bowel movements and appetite are normal, her bleeding after sex has resolved, denies hot flashes  and minimal vaginal dryness that has improved as well. Reports she had some her external beam therapy in [place], [place] before she moved here and she is trying to achieve " pregnancy with her frozen embryo, and may have a hysteroscopy and imaging before this.          Objective    BSA: 2.06 meters squared  /82   Pulse 82   Temp 36.4 °C (97.5 °F)   Wt 86 kg (189 lb 11.2 oz)   BMI 27.22 kg/m²      Physical Exam  Constitutional:       General: She is not in acute distress.     Appearance: Normal appearance.   Cardiovascular:      Rate and Rhythm: Normal rate and regular rhythm.   Pulmonary:      Effort: Pulmonary effort is normal.      Breath sounds: Normal breath sounds.   Abdominal:      General: Bowel sounds are normal. There is no distension.   Genitourinary:     Comments: Pelvic exam: Normal external genitalia and vaginal mucosa.  Cervical borders obliterated from prior RT.  Cervical os not visible.  On palpation uterus is small and mobile, with no obvious residual tumor or parametrial extension  Musculoskeletal:      Right forearm: Normal.      Left forearm: Normal.      Right hand: Normal.      Left hand: Normal.      Right lower leg: Normal.      Left lower leg: Normal.      Right foot: Normal.      Left foot: Normal.         Performance Status:  Asymptomatic    Assessment/Plan     Oncology History    No history exists.        Problem List Items Addressed This Visit             ICD-10-CM    Malignant neoplasm of cervix (CMS/HCC) - Primary C53.9    Relevant Orders    MR pelvis w IV contrast       Treatment Plans       No treatment plans exist        - Performed pelvic examination in office -No evidence of recurrent disease in exam.   Significant anatomic distortion after radiation.  - reviewed natural history and prognosis of cervical cancer.  Discussed dosing of radiation.  Reviewed that pregnancy is typically not recommended or possible after definitive dose radiation to the uterus and cervix.  Pregnancy would be high risk for placental abnormalities and fetal growth restriction.  Would be unlikely to be possible to have a vaginal delivery given distortion of the  anatomy and fibrosis of the cervix.  We discussed that pregnancy would not impact cancer recurrence risks.  Patient expressed understanding.  -Discussed high likelihood of need for hysterectomy at time of  section given fibrosis, would also be high risk for midtrimester hysterectomy if she has fetal demise or accreta.  -Discussed risks and benefits of potential cerclage placement.  I am not sure we can assume that she will be at high risk for cervical incompetence because of radiation as there is likely a large degree of fibrosis.  Vaginal cerclage payment would not be possible given anatomic changes after radiation.  Could consider attempt at laparoscopic robotic or open abdominal cerclage if patient desires.  Discussed risks and benefits of placing cerclage prior to pregnancy and risk to ureters, bladder and surrounding structures if surgery is attempted after radiation.  Discussed risks and benefits of cerclage placement after pregnancy which would likely need to be done abdominally.  We came to a shared decision to obtain a pelvic MRI to better delineate the amount of remaining cervical tissue.  She will also proceed with scheduling hysteroscopy with reproductive endocrinologist to evaluate endometrial cavity.    Follow-up for further discussion after MRI        Scribe Attestation  By signing my name below, I, Padma Wade   attest that this documentation has been prepared under the direction and in the presence of Felicitas Wilkerson MD.

## 2023-12-07 ENCOUNTER — TELEMEDICINE (OUTPATIENT)
Dept: ENDOCRINOLOGY | Facility: CLINIC | Age: 33
End: 2023-12-07
Payer: COMMERCIAL

## 2023-12-07 VITALS — HEIGHT: 70 IN | WEIGHT: 189 LBS | BODY MASS INDEX: 27.06 KG/M2

## 2023-12-07 DIAGNOSIS — N97.9 FEMALE INFERTILITY: ICD-10-CM

## 2023-12-07 DIAGNOSIS — E28.39 PRIMARY OVARIAN INSUFFICIENCY: Primary | ICD-10-CM

## 2023-12-07 PROCEDURE — 99215 OFFICE O/P EST HI 40 MIN: CPT | Performed by: OBSTETRICS & GYNECOLOGY

## 2023-12-07 RX ORDER — PROGESTERONE 200 MG/1
200 CAPSULE ORAL DAILY
Qty: 30 CAPSULE | Refills: 11 | Status: SHIPPED | OUTPATIENT
Start: 2023-12-07 | End: 2024-12-06

## 2023-12-07 RX ORDER — ESTRADIOL 0.1 MG/D
1 FILM, EXTENDED RELEASE TRANSDERMAL 2 TIMES WEEKLY
Qty: 8 PATCH | Refills: 11 | Status: SHIPPED | OUTPATIENT
Start: 2023-12-07 | End: 2024-12-06

## 2023-12-07 ASSESSMENT — PAIN SCALES - GENERAL: PAINLEVEL: 0-NO PAIN

## 2023-12-07 NOTE — PATIENT INSTRUCTIONS
Plan:  MRI as planned to assess pelvic anatomy  Will start HRT with estrace patch 100 mcg and Prometrium 200 mg daily 12 days per months.  Will stop OCP.  Recommend Pentoxifylline 400 mg BID and Vitamin E 400 mg BID for three months along with HRT  Will bring patient's case to ethics committee to see if we agree to proceed given patient's situation  If approved to proceed, will assess uterine lining by repeat US and diagnostic hysteroscopy under anesthesia  Will need boarding pass completed if decides to proceed.         Bev Mayer  12/07/2023  3:42 PM

## 2023-12-07 NOTE — PROGRESS NOTES
Virtual Visit    Follow Up Visit HPI    Patient is a 33 y.o.  female with  cervical cancer  presenting today for follow up visit.     Clotilde Connor is a 33 y.o.  female who presents with Please tell us your reason for this visit today: Other  Secondary infertility,tubal factor (history of R salpingectomy and left tubal ligation), has embryo frozen in [place], s/p treatment for cervical cancer      OB Hx:  -  full term, no complications  - Ruptured ectopic, right tube s/p R salpingectomy (conceived with Mirena IUD in place)- had left tubal ligation at that time.  -Both from previous partner, now  from him    Testing to date:   Other: see below  2023  Uterus:     Visualized  Uterus position:  anteverted  Myometrium: normal  Endometrium:      uniform echogenicity: isoechogenic  Cervix details:   normal  Uterus length     64.5 mm  Uterus width      50.9 mm  Uterus height     37.4 mm  Endometrial thickness, total  2.9 mm  Right Ovary  =========     Rt ovary:   Visualized  Rt ovary morphology:    normal  Rt ovary D1 20.5 mm  Rt ovary D2 11.3 mm  Rt ovary D3 11.8 mm  Rt ovary Vol      1.4 cmï¿½  Rt ovarian follicle(s): Follicles identified  Rt ovarian follicles other findings:      Antral Follicles 2<5mm  Left Ovary    Hysterosalpingogram: N/A  Saline Infused Sonography: N/A  GYN Pelvic Ultrasound: US PELVIS TRANSVAGINAL (2023):     Partner SA: NA    Treatment to date: see below  Saw [facility] specialist in [place] in 2021- recommended to get pap smear as part of workup  Pap smear demonstrated cervical cancer- adenosquamous cancer- Stage IIB  Underwent fertility preservation prior to cervical cancer therapy- froze 1 blastocyst  Now s/p ChemoRT,  EBRT, 4500 cGy 25 fractions and s/p 4 fractions brachytherapy.  Completed 2022.  Currently in remission     IVF cycle in [place]- 24 eggs retrieved, 4 blastocysts frozen and PGT tested --> 1 euploid blastocyst frozen     INTERVAL  "HISTORY-  Saw [facility] and [facility] for consults  [facility] (Dr. Wilkerson) counseled extensively regarding risks of pregnancy given radiation treatment- does not recommend pregnancy  Discussed possible hysterectomy in pregnancy and associated risks  Agreed to get MRI to evaluate pelvic anatomy    [facility]- Discussed concerns for cervical incompetence and the affects of pelvic radiation on endometrial integrity and blood flow the uterus.         Past Medical History:   Diagnosis Date    Cervical cancer (CMS/HCC)      Past Surgical History:   Procedure Laterality Date    CERVICAL BIOPSY      ECTOPIC PREGNANCY SURGERY      OVARY SURGERY       Current Outpatient Medications on File Prior to Visit   Medication Sig Dispense Refill    Junel FE 1., 28, 1.5 mg-30 mcg (21)/75 mg (7) tablet Take 1 tablet by mouth once daily. as directed      prenatal no115/iron/folic acid (PRENATAL 19 ORAL) Take by mouth.       No current facility-administered medications on file prior to visit.       BMI:   BMI Readings from Last 1 Encounters:   23 27.12 kg/m²     VITALS:  Ht 1.778 m (5' 10\")   Wt 85.7 kg (189 lb)   LMP 2023 (Exact Date)   BMI 27.12 kg/m²   LMP: Patient's last menstrual period was 2023 (exact date).    ASSESSMENT   33 y.o.  female with secondary infertility,,  Cervical cancer s/p radiation  and the following pertinent medical issues: desires pregnancy with her single embryo s/p radiation .    COUNSELING  See below    I have reiterated to the patient the low chance of success with IVF ET and successful pregnancy given the patient's history of pelvic radiation  She has been counseled by both Dr. Mcfarland from [facility] and Dr. Chino from [facility] as well.  She and her  are well informed of the risks and still desires to proceed with ET, with the understanding that the chance of success is small and the risks are significant.  The patient does not want to use a gestational carrier for Protestant " reasons.      Routine Testing  [facility]ertility Center  STDs Within 1 year   Genetic carrier Waiver/Completed   T&S Within 1 year   AMH Within 1 year   TSH Within 1 year   Rubella/Varicella Within 5 years     BMI Testing  [facility]  CBC Within 1 year   CMP Within 1 year   HgbA1c Within 1 year   Mag, Phos, Vit D <18 Within 1 year   MFM > 40  REQ   Wt loss consult > 40 OPT     PLAN  No orders of the defined types were placed in this encounter.      FOLLOW UP   Consults:  Ethics committee  Engaged MD  Take prenatal vitamins, vitamin D 2000 IUs daily  Discussed that treatment cannot proceed until checklist items are complete.   Additional testing for BMI < 18 or > 40: NA.  Patient to schedule follow up visit in 2 months. Advised patient to arrange this now with the .  Chart to primary nurse for care coordination and patient check list/education.    Plan:  MRI as planned to assess pelvic anatomy  Will start HRT with estrace patch 100 mcg and Prometrium 200 mg daily 12 days per months.  Will stop OCP.  Recommend Pentoxifylline 400 mg BID and Vitamin E 400 mg BID for three months along with HRT  Will bring patient's case to ethics committee to see if we agree to proceed given patient's situation  If approved to proceed, will assess uterine lining by repeat US and diagnostic hysteroscopy under anesthesia  Will need boarding pass completed if decides to proceed.         Bev Mayer  12/07/2023  3:42 PM

## 2023-12-08 ENCOUNTER — APPOINTMENT (OUTPATIENT)
Dept: ENDOCRINOLOGY | Facility: CLINIC | Age: 33
End: 2023-12-08
Payer: COMMERCIAL

## 2023-12-08 ENCOUNTER — TELEPHONE (OUTPATIENT)
Dept: ENDOCRINOLOGY | Facility: CLINIC | Age: 33
End: 2023-12-08
Payer: COMMERCIAL

## 2023-12-08 DIAGNOSIS — N97.9 FEMALE INFERTILITY: ICD-10-CM

## 2023-12-08 RX ORDER — PENTOXIFYLLINE 400 MG/1
400 TABLET, EXTENDED RELEASE ORAL 2 TIMES DAILY
Qty: 60 TABLET | Refills: 3 | Status: SHIPPED | OUTPATIENT
Start: 2023-12-08 | End: 2024-05-21 | Stop reason: ALTCHOICE

## 2023-12-08 NOTE — TELEPHONE ENCOUNTER
Returned patient phone call regarding 3 month rx of medications for HRT. Patient stated there was no rx called in for Pentoxifylline 400 mg BID. Patient instructed this RN pended the order to a provider and will be available at her local pharmacy later this afternoon. Patient agreeable.   DREW VAZ on 12/8/23 at 10:09 AM.

## 2023-12-21 ENCOUNTER — TELEPHONE (OUTPATIENT)
Dept: ENDOCRINOLOGY | Facility: CLINIC | Age: 33
End: 2023-12-21
Payer: COMMERCIAL

## 2023-12-21 NOTE — TELEPHONE ENCOUNTER
Patient calling because she lost her estrace patches. Patient made aware she should have plenty of refills to call in for another Rx.    GAETANO MCGRAW on 12/21/23 at 9:17 AM.

## 2023-12-27 ENCOUNTER — HOSPITAL ENCOUNTER (OUTPATIENT)
Dept: RADIOLOGY | Facility: HOSPITAL | Age: 33
Discharge: HOME | End: 2023-12-27
Payer: COMMERCIAL

## 2023-12-27 DIAGNOSIS — C53.0 MALIGNANT NEOPLASM OF ENDOCERVIX (MULTI): ICD-10-CM

## 2023-12-27 PROCEDURE — 2550000001 HC RX 255 CONTRASTS: Performed by: OBSTETRICS & GYNECOLOGY

## 2023-12-27 PROCEDURE — 72197 MRI PELVIS W/O & W/DYE: CPT | Performed by: RADIOLOGY

## 2023-12-27 PROCEDURE — 72197 MRI PELVIS W/O & W/DYE: CPT

## 2023-12-27 PROCEDURE — A9575 INJ GADOTERATE MEGLUMI 0.1ML: HCPCS | Performed by: OBSTETRICS & GYNECOLOGY

## 2023-12-27 RX ORDER — GADOTERATE MEGLUMINE 376.9 MG/ML
17 INJECTION INTRAVENOUS
Status: COMPLETED | OUTPATIENT
Start: 2023-12-27 | End: 2023-12-27

## 2023-12-27 RX ADMIN — GADOTERATE MEGLUMINE 17 ML: 376.9 INJECTION INTRAVENOUS at 14:14

## 2024-01-09 ENCOUNTER — TELEPHONE (OUTPATIENT)
Dept: ENDOCRINOLOGY | Facility: CLINIC | Age: 34
End: 2024-01-09
Payer: COMMERCIAL

## 2024-01-09 NOTE — TELEPHONE ENCOUNTER
Called patient to review ethics committee review of patient's request for embryo transfer  Ethics committee ultimately did not give approval for [facility] to performed embryo transfer  Patient given recommendation to discuss with clinic in [place] who created the embryo.  If they are willing to do transfer we can do Ultrasound monitoring in our office.     Patient expressed understanding of above and will follow up me in the office for a visit to review further.    GETACHEW CABRERA on 1/9/24 at 5:11 PM.

## 2024-01-11 NOTE — PROGRESS NOTES
"Patient ID: Clotilde Connor is a 33 y.o. female.  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   4 cm lesion with full thickness cervical involvement and early parametrial invasion.    PET/CT performed mid-cranium to midthighs on 3/31/2022:  No extracervical abnormal findings.        Underwent chemo-radiation therapy:  Dr. Rushing at the [facility]:  ChemoRT.  EBRT.  4500 cGy 25 fractions.  Dr. Erasmo Howe, Brachytherapy.  4 fractions, 2800 cGy.  Completed 7/8/2022.    No ovarian transposition.    Did have embryos harvested in [place] prior to chemoRT.    Treatment history:  3/7/2022 Excisional procedure  Final pathology:  2.6 x 2.3 x 2.0 cm specimen submitted.  Invasive carcinoma diagnosed,  most consistent with adenosquamous carcinoma.  Tumor measures 2.6 cm grossly, with \"circumferential involvement of the cone specimen\".  Depth of invasion is at least 8 mm.  Perineural invasion noted as were findings suspicious for LVSI.    ECC:  Non-diagnostic.  3/31/2022 PET/CT No extracervical abnormal findings.      Reported \"indistinct 3.6  x 4.2 cm enhancement and thickening with associated FDG uptake.    ChemoRT.  EBRT.  4500 cGy 25 fractions. in [place].   Completed 6/2022  - 7/8/2022  HDR BT 4 fractions, 2800 cGy  3/13/23 EUA cervical biopsy, ECC radiation changes, no evidence of malignancy  PMHx: None  PSHx: None      Interval history:  Patient is a 33 year old female with stage IIB adenosquamous carcinoma of the cervix s/p ChemoRT,  EBRT, 4500 cGy 25 fractions and s/p 4 fractions brachytherapy.  Completed 7/8/2022.  3/13/23 EUA cervical biopsy, ECC radiation changes, no evidence of malignancy.  Last pap 7/2023 was negative, HPV-.  Here for 3 month follow up.  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      Patient states she never started the Estrace.  She was on OCP's, having intermenstrual spotting and heavy bleeding.  She has " been switched to estrogen patch and was given 12 day progesterone course.  She had bleeding this month but not as heavy as previously.  She states she feels her energy has improved.  She is working out currently and feeling stronger.  She states she was not feeling like herself but that has improved.  Denies any bowel or bladder issues.         Objective    Visit Vitals  /70 (BP Location: Right arm, Patient Position: Sitting, BP Cuff Size: Adult)   Pulse 67   Temp 36.3 °C (97.3 °F) (Temporal)   Resp 16       Constitutional: Doing well. MELISSA  Eyes: PERRL  ENMT: Moist mucus membranes  Head/Neck: Supple. Symmetrical  Cardiovascular: Regular, rate and rhythm. 2+ equal pulses of the extremities  Respiratory/Thorax: CTA. RRR. Chest rise symmetrical.  Gastrointestinal: Non-distended, soft, non-tender  Genitourinary: Cervical os visualized with no lesions, radiation changes noted. no CMT, small mobile uterus, no adnexal masses noted. Smooth vaginal walls.  Vulva with no lesions noted.   Musculoskeletal: ROM intact, no joint swelling, normal strength  Extremities: No edema  Neurological: Alert and oriented x 3. Pleasant and cooperative.  Lymphatic: No lymphadenopathy. No lymphedema  Psychological: Appropriate mood and behavior  Skin: Warm and dry, no lesions, no rashes    A complete review of systems was performed and all systems were normal except what is noted in the interval history.      Assessment/Plan   Patient is a 33 year old female with stage IIB adenosquamous carcinoma of the cervix s/p ChemoRT,  EBRT, 4500 cGy 25 fractions and s/p 4 fractions brachytherapy.  Completed 7/8/2022.  MELISSA 1.5 years.       PLAN:  F/U in 3 months or as needed.  Patient would like to switch office locations due to driving distance

## 2024-01-16 ENCOUNTER — OFFICE VISIT (OUTPATIENT)
Dept: GYNECOLOGIC ONCOLOGY | Facility: CLINIC | Age: 34
End: 2024-01-16
Payer: COMMERCIAL

## 2024-01-16 ENCOUNTER — LAB (OUTPATIENT)
Dept: LAB | Facility: LAB | Age: 34
End: 2024-01-16
Payer: COMMERCIAL

## 2024-01-16 VITALS
WEIGHT: 190.7 LBS | DIASTOLIC BLOOD PRESSURE: 70 MMHG | BODY MASS INDEX: 27.36 KG/M2 | RESPIRATION RATE: 16 BRPM | OXYGEN SATURATION: 98 % | HEART RATE: 67 BPM | SYSTOLIC BLOOD PRESSURE: 112 MMHG | TEMPERATURE: 97.3 F

## 2024-01-16 DIAGNOSIS — N97.9 FEMALE INFERTILITY: ICD-10-CM

## 2024-01-16 DIAGNOSIS — Z01.83 ENCOUNTER FOR RH BLOOD TYPING: ICD-10-CM

## 2024-01-16 DIAGNOSIS — Z13.29 SCREENING FOR THYROID DISORDER: ICD-10-CM

## 2024-01-16 DIAGNOSIS — Z11.59 ENCOUNTER FOR SCREENING FOR OTHER VIRAL DISEASES: ICD-10-CM

## 2024-01-16 DIAGNOSIS — C53.9 MALIGNANT NEOPLASM OF CERVIX, UNSPECIFIED SITE (MULTI): Primary | ICD-10-CM

## 2024-01-16 DIAGNOSIS — Z11.3 SCREENING FOR STDS (SEXUALLY TRANSMITTED DISEASES): ICD-10-CM

## 2024-01-16 LAB
ABO GROUP (TYPE) IN BLOOD: NORMAL
ANTIBODY SCREEN: NORMAL
ESTRADIOL SERPL-MCNC: 48 PG/ML
HBV SURFACE AG SERPL QL IA: NONREACTIVE
HCV AB SER QL: NONREACTIVE
HIV 1+2 AB+HIV1 P24 AG SERPL QL IA: NONREACTIVE
PROGEST SERPL-MCNC: 3.1 NG/ML
RH FACTOR (ANTIGEN D): NORMAL
RUBV IGG SERPL IA-ACNC: 1.5 IA
RUBV IGG SERPL QL IA: POSITIVE
T4 FREE SERPL-MCNC: 1.05 NG/DL (ref 0.78–1.48)
TREPONEMA PALLIDUM IGG+IGM AB [PRESENCE] IN SERUM OR PLASMA BY IMMUNOASSAY: NONREACTIVE
TSH SERPL-ACNC: 0.01 MIU/L (ref 0.44–3.98)
VARICELLA ZOSTER IGG INDEX: 5.2 IA
VZV IGG SER QL IA: POSITIVE

## 2024-01-16 PROCEDURE — 87800 DETECT AGNT MULT DNA DIREC: CPT

## 2024-01-16 PROCEDURE — 86780 TREPONEMA PALLIDUM: CPT

## 2024-01-16 PROCEDURE — 87389 HIV-1 AG W/HIV-1&-2 AB AG IA: CPT

## 2024-01-16 PROCEDURE — 86850 RBC ANTIBODY SCREEN: CPT

## 2024-01-16 PROCEDURE — 36415 COLL VENOUS BLD VENIPUNCTURE: CPT

## 2024-01-16 PROCEDURE — 87340 HEPATITIS B SURFACE AG IA: CPT

## 2024-01-16 PROCEDURE — 86900 BLOOD TYPING SEROLOGIC ABO: CPT

## 2024-01-16 PROCEDURE — 86317 IMMUNOASSAY INFECTIOUS AGENT: CPT

## 2024-01-16 PROCEDURE — 86803 HEPATITIS C AB TEST: CPT

## 2024-01-16 PROCEDURE — 84144 ASSAY OF PROGESTERONE: CPT

## 2024-01-16 PROCEDURE — 99213 OFFICE O/P EST LOW 20 MIN: CPT | Performed by: NURSE PRACTITIONER

## 2024-01-16 PROCEDURE — 84439 ASSAY OF FREE THYROXINE: CPT

## 2024-01-16 PROCEDURE — 86787 VARICELLA-ZOSTER ANTIBODY: CPT

## 2024-01-16 PROCEDURE — 82670 ASSAY OF TOTAL ESTRADIOL: CPT

## 2024-01-16 PROCEDURE — 84443 ASSAY THYROID STIM HORMONE: CPT

## 2024-01-16 PROCEDURE — 86901 BLOOD TYPING SEROLOGIC RH(D): CPT

## 2024-01-16 PROCEDURE — 1036F TOBACCO NON-USER: CPT | Performed by: NURSE PRACTITIONER

## 2024-01-16 ASSESSMENT — PAIN SCALES - GENERAL: PAINLEVEL: 0-NO PAIN

## 2024-01-17 LAB
C TRACH RRNA SPEC QL NAA+PROBE: NEGATIVE
N GONORRHOEA DNA SPEC QL PROBE+SIG AMP: NEGATIVE

## 2024-01-18 ENCOUNTER — OFFICE VISIT (OUTPATIENT)
Dept: ENDOCRINOLOGY | Facility: CLINIC | Age: 34
End: 2024-01-18
Payer: COMMERCIAL

## 2024-01-18 VITALS
HEART RATE: 71 BPM | DIASTOLIC BLOOD PRESSURE: 75 MMHG | BODY MASS INDEX: 27.41 KG/M2 | WEIGHT: 191 LBS | SYSTOLIC BLOOD PRESSURE: 122 MMHG

## 2024-01-18 DIAGNOSIS — Z31.89 ENCOUNTER FOR FERTILITY PLANNING: Primary | ICD-10-CM

## 2024-01-18 PROCEDURE — 1036F TOBACCO NON-USER: CPT | Performed by: OBSTETRICS & GYNECOLOGY

## 2024-01-18 PROCEDURE — 99215 OFFICE O/P EST HI 40 MIN: CPT | Performed by: OBSTETRICS & GYNECOLOGY

## 2024-01-18 ASSESSMENT — PAIN SCALES - GENERAL: PAINLEVEL: 0-NO PAIN

## 2024-01-18 NOTE — PROGRESS NOTES
"  In Person    Follow Up Visit HPI    Patient is a 33 y.o.  female with  history of cervical cancer s/p pelvic radiation  presenting today for follow up visit.     Interval history:  Has had extensive discussions with MFM (Dr. Chino) and GYN Oncology (Dr. Wilkerson)  Ethics committee discussion in early January-  \"Ethics committee ultimately did not give approval for  to performed embryo transfer  Patient given recommendation to discuss with clinic in [place] who created the embryo.  If they are willing to do transfer we can do Ultrasound monitoring in our office. \"    Discussion of committee were that risks of pregnancy were too great for us to proceed with embryo transfer.    Per Dr. Wilkerson, she would not perform cerclage after discussions with her partners  Need for extensive surgery if she has a pregnancy loss or C/S are very high, including potential of need for colostomy and/or urinary conduit   Surgery for any indication would be difficult and risky to the patient  Per Dr. Wilkerson the scarring from the brachytherapy makes it impossible in most cases to separate the uterus/cervix from bladder/bowel.     Ethics committee also reviewed that even a first trimester loss may require extensive surgery.    Patient presents today for further discussion and to review the ethics committee decision.    In our discussion today the patient still had more questions about this decision and would really like to further pursue options for embryo transfer.  We did clarify the timeline given that the 6 committee's recommendation was to return to [place] to have the transfer.  The patient clarified that she had had a diagnosis of cervical cancer that would potentially be amenable to radical trachelectomy.  And that is when she sought opinion from Dr. Rios and wanted to consider fertility preservation from that perspective.  She froze the embryo in [place] with the understanding that she would potentially use it " following a radical trachelectomy.  At the time it was not known that she was not a candidate for radical colectomy and would need radiation therapy instead.  Therefore the patient does not think it returning to [place] will be beneficial for her.  They are interested in potentially getting a second opinion regarding complications in pregnancy and will reach out to the [facility] for this.    Today the patient states more clearly that she knows that the chances of this being successful are low however she needs to feel that she has given her embryo a chance.  She again states firmly the gestational carrier is not an option for her due to her Jew believes.  She also is willing to take on risks including damage to her bowel and bladder from additional surgery to ensure that she has a chance of a pregnancy.              Testing to date:      Latest Reference Range & Units Most Recent   FOLLICLE STIMULATING HORMONE IU/L 2.7  4/11/23 13:47   Thyroxine, Free 0.78 - 1.48 ng/dL 1.05  1/16/24 14:20   LH IU/L 0.6  4/11/23 13:47   Progesterone ng/mL 3.1  1/16/24 14:20   Thyroid Stimulating Hormone 0.44 - 3.98 mIU/L 0.01 (L)  1/16/24 14:20   Estradiol pg/mL 48  1/16/24 14:20   GLUCOSE 74 - 99 mg/dL 95  3/8/23 08:23   (L): Data is abnormally low  Other:     Hysterosalpingogram: none  Saline Infused Sonography: none  GYN Pelvic Ultrasound: US PELVIS TRANSVAGINAL (11/14/2023):     Narrative & Impression   Interpreted by: Bev Mayer  Indication  ========     Fertility Testing  Impression  =========     Normal appearing uterus. Small ovaries noted bilaterally.  Follow-up  ========     Follow-up with ordering Provider  Uterus  ======     Uterus:     Visualized  Uterus position:  anteverted  Myometrium: normal  Endometrium:      uniform echogenicity: isoechogenic  Cervix details:   normal  Uterus length     64.5 mm  Uterus width      50.9 mm  Uterus height     37.4 mm  Endometrial thickness, total  2.9 mm  Right  Ovary  =========     Rt ovary:   Visualized  Rt ovary morphology:    normal  Rt ovary D1 20.5 mm  Rt ovary D2 11.3 mm  Rt ovary D3 11.8 mm  Rt ovary Vol      1.4 cmï¿½  Rt ovarian follicle(s): Follicles identified  Rt ovarian follicles other findings:      Antral Follicles 2<5mm  Left Ovary  ========     Lt ovary:   Visualized  Lt ovary morphology:    normal  Lt ovary D1 21.5 mm  Lt ovary D2 9.1 mm  Lt ovary D3 12.7 mm  Lt ovary Vol      1.3 cmï¿½  Lt ovarian follicle(s): Follicles identified  Lt ovarian follicles other findings:      Antral Follicles 3<5mm  Cul de Sac  =========         Pelvic MRI:  FINDINGS:  UTERUS:  The uterus is anteverted and measures 8 x  x 4 cm.   The thickness of  the junctional zone is within normal limits.   The endometrium has  normal thickness and appearance.      No detected residual uterine mass. There is relatively uniform T2  hypointensity involving the cervix as noted on short axis series 8,  image 22 compatible with treatment related fibrosis with lack of  normal zonal distinction. There is no evident residual mass. There  are tiny foci of signal dropout within the vagina adjacent to the  ectocervix representative portion sagittal series 4, image 14 and  postcontrast series 20, image 75 and series 7, image 11 likely either  related to or foci of gas. Other than these foci of signal dropout  the uterus including the cervix demonstrates uniform enhancement  without evidence of necrosis.      OVARIES/ADNEXA:      The ovaries are diminutive compared with preoperative examination.  Right ovary measures 1.3 x 1.1 x 2.6 cm noted on series 5, image 6  and left ovary measures 1.4 x 1.2 x 2.3 cm noted on series 5, image 8      PERITONEAL FLUID:  No  free or loculated fluid collections are evident in the pelvis.      PELVIC LYMPH NODES:  No abnormally enlarged pelvic lymph nodes are identified.      BOWEL:  Normal caliber.      BONES:  No focal lesions are noted in the bone. Treatment  related fatty  marrow portions of the bony pelvis.      IMPRESSION:  1.  Expected post treatment changes uterine cervix which is T2  hypointense compatible with fibrosis without evident residual  neoplasm or tissue necrosis.  2.  Treatment related ovarian size reduction.      Partner SA: NA    Treatment to date: 1 embryo frozen in Fertility clinic in [place]    Past Medical History:   Diagnosis Date    Cervical cancer (CMS/HCC)      Past Surgical History:   Procedure Laterality Date    CERVICAL BIOPSY      ECTOPIC PREGNANCY SURGERY      OVARY SURGERY       Current Outpatient Medications on File Prior to Visit   Medication Sig Dispense Refill    estradiol (Vivelle-DOT) 0.1 mg/24 hr patch Place 1 patch over 96 hours on the skin 2 times a week. 8 patch 11    Junel FE 1.5/30, 28, 1.5 mg-30 mcg (21)/75 mg (7) tablet Take 1 tablet by mouth once daily. as directed      pentoxifylline (Trental) 400 mg ER tablet Take 1 tablet (400 mg) by mouth 2 times a day. Do not crush, chew, or split. 60 tablet 3    prenatal no115/iron/folic acid (PRENATAL 19 ORAL) Take by mouth.      progesterone (Prometrium) 200 mg capsule Take 1 capsule (200 mg) by mouth once daily. 12 days per month 30 capsule 11     No current facility-administered medications on file prior to visit.       BMI:   BMI Readings from Last 1 Encounters:   24 27.41 kg/m²     VITALS:  /75 (BP Location: Right arm, Patient Position: Sitting, BP Cuff Size: Adult)   Pulse 71   Wt 86.6 kg (191 lb)   LMP 2024 (Exact Date)   BMI 27.41 kg/m²   LMP: Patient's last menstrual period was 2024 (exact date).    ASSESSMENT   33 y.o.  female with secondary infertility s/p pelvic radiation for cervical cancer.     PLAN  No orders of the defined types were placed in this encounter.      FOLLOW UP   We had an extensive discussion today about the patient's options.  We reviewed the ethics committee's decision in detail including the primary concerns raised by  the committee of risks to the patient in pregnancy including risks if there is a pregnancy loss and risks if there is a successful pregnancy.  We also clarified the timeline with her [facility] as noted above the patient was is very firm that she would like to pursue this and would like to pursue all avenues to pursue an embryo transfer.  We discussed several options for next steps.  1. Would recommend follow up discussion with Dr. Chino to further review risks in pregnancy.  I also recommend a second opinion consult at the [facility].  We discussed options for second opinion with infertility or MFM.  At this point the patient would prefer a second opinion MFM consult and I have referred them to Dr. Kike Macias at the [facility].  2.  They would like to appeal the ethics committee decision and I did discuss the option of having a wider hospital wide ethics committee review which I can bring to the ethics committee.  If a wider hospital review occurs the patient would like to present her story either by letter or by an appearance at the meeting.  3.  The patient may want to consider trying a hysteroscopy to see if getting into the endometrial cavity is possible.  I discussed that if we pursue this it is not an endorsement of our ability to perform an embryo transfer here.  However this information may be helpful for the patient as she is planning her treatment and she may be able to utilize this to help her make her decision even if this means going to another institution.    We will follow-up with the patient regarding ethics committee discussions.  She will follow-up with Dr. Chino to schedule another consult and she will also follow-up with Dr. Macias at the [facility].          Bev Mayer  01/18/2024  4:34 PM

## 2024-01-23 ENCOUNTER — TELEPHONE (OUTPATIENT)
Dept: ENDOCRINOLOGY | Facility: CLINIC | Age: 34
End: 2024-01-23
Payer: COMMERCIAL

## 2024-01-23 DIAGNOSIS — Z01.812 ENCOUNTER FOR PREPROCEDURAL LABORATORY EXAMINATION: ICD-10-CM

## 2024-01-23 DIAGNOSIS — N88.2 CERVICAL STENOSIS (UTERINE CERVIX): ICD-10-CM

## 2024-01-23 DIAGNOSIS — Z31.41 FERTILITY TESTING: Primary | ICD-10-CM

## 2024-01-23 RX ORDER — MISOPROSTOL 200 UG/1
400 TABLET ORAL ONCE
Qty: 2 TABLET | Refills: 0 | Status: SHIPPED | OUTPATIENT
Start: 2024-01-23 | End: 2024-05-21 | Stop reason: ALTCHOICE

## 2024-01-23 NOTE — TELEPHONE ENCOUNTER
Called patient to review next steps after recent office visit.    Updates:  She has been in contact with [facility] and will schedule second opinion consult with Dr. Elias  I had a discussion with Dr. Wilkerson, Dr. Nolasco, Dr. Chino, and Dr. Simeon.  We all agree it is reasonable to proceed with attempt at diagnostic hysteroscopy, with the understanding that this may not be possible, and also with the understanding that this does not impact our current decision not to proceed with transfer. I reiterated this to the patient- even if hysteroscopy is successful we will  not be able to proceed with transfer (unless something changes with ethics committee).  We will postpone hospital-wide ethics discussion until after hysteroscopy.    Order her hysteroscopy with anesthesia placed.  Will give Rx for cytotec pre-procedure.    GETACHEW CABRERA on 1/23/24 at 10:26 AM.

## 2024-02-02 ENCOUNTER — APPOINTMENT (OUTPATIENT)
Dept: ENDOCRINOLOGY | Facility: CLINIC | Age: 34
End: 2024-02-02
Payer: COMMERCIAL

## 2024-02-14 ENCOUNTER — TELEPHONE (OUTPATIENT)
Dept: ENDOCRINOLOGY | Facility: CLINIC | Age: 34
End: 2024-02-14
Payer: COMMERCIAL

## 2024-02-14 DIAGNOSIS — R79.89 LOW TSH LEVEL: Primary | ICD-10-CM

## 2024-02-14 NOTE — TELEPHONE ENCOUNTER
Spoke with patient to get hysteroscopy under anesthesia scheduled. Patient states her  is a surgeon and will need to check with his schedule to confirm her date that was given to her of 2/20/24. Pt understands that she will not know a procedure time until first thing on 2/19/24 morning. States she will call back with what her  states will work best since he is her ride home from the procedure. Patient will call back today.     Santa Mario RN 02/14/24 1:10 PM

## 2024-02-14 NOTE — TELEPHONE ENCOUNTER
Patient started her cycle yesterday calling to schedule Hysteroscopy under anesthesia.  Please call her.

## 2024-02-14 NOTE — PROGRESS NOTES
Called pt with low TSH level, concern for hyperthyroidism although free T4 was WNL. Recommend evaluation with medical endocrinology, pt agrees. Referral placed.   Reviewed and approved by ELLIOT CASTRO on 2/14/24 at 2:28 PM.

## 2024-02-14 NOTE — TELEPHONE ENCOUNTER
Patient is calling to talk to Dr Mayer started her cycle yesterday   She wanted to schedule Hysteroscopy under anesthesia sending message to procedure area too.  Please call her.

## 2024-02-14 NOTE — TELEPHONE ENCOUNTER
My chart message sent to patient. Patient instructed that procedure area nurses will contact her to schedule hysteroscopy under anesthesia.   DREW VAZ on 2/14/24 at 9:13 AM.

## 2024-02-14 NOTE — TELEPHONE ENCOUNTER
Spoke with patient regarding date for procedure. RN states that patient will be placed on the schedule with a tentative date of 2/21/2024. Patient states understanding and agreeable. Patient given instructions; NPO at [place] the night before, arrive 1 hour prior to procedure time at [facility] [address] with ride to remain in building during entirety of procedure, no oral diabetic medication the night before or day of procedure, may take necessary medications with small sip of water. Patient asking if she should hold her medication, Pentoxifylline, prior to the procedure. RN will reach out to anesthesia to request answer. Patient agreeable.     aSnta Mario RN 02/14/24 1:53 PM

## 2024-02-14 NOTE — TELEPHONE ENCOUNTER
Pt called back with date of 2/21 as preferred date to be scheduled. RN stated that this date is tentative and will need to speak with another patient to confirm procedure schedule with her prior to giving her a date. Patient states understanding that she will receive a phone call back as soon as possible regarding procedure date.     Santa Mario RN 02/14/24 1:22 PM

## 2024-02-19 ENCOUNTER — TELEPHONE (OUTPATIENT)
Dept: ENDOCRINOLOGY | Facility: CLINIC | Age: 34
End: 2024-02-19
Payer: COMMERCIAL

## 2024-02-19 NOTE — TELEPHONE ENCOUNTER
Phone call to pt regarding procedure time of 1030am on Wednesday. Pt knows to arrive to office by 930am. Pt verbalized understanding as is agreeable.  Migdalia Whitmore RN 02/19/24 2:39 PM

## 2024-02-20 ENCOUNTER — PREP FOR PROCEDURE (OUTPATIENT)
Dept: ENDOCRINOLOGY | Facility: CLINIC | Age: 34
End: 2024-02-20
Payer: COMMERCIAL

## 2024-02-20 RX ORDER — ACETAMINOPHEN 325 MG/1
650 TABLET ORAL ONCE AS NEEDED
Status: CANCELLED | OUTPATIENT
Start: 2024-02-20

## 2024-02-20 RX ORDER — HYDROCODONE BITARTRATE AND ACETAMINOPHEN 5; 325 MG/1; MG/1
1 TABLET ORAL ONCE AS NEEDED
Status: CANCELLED | OUTPATIENT
Start: 2024-02-20

## 2024-02-20 RX ORDER — MORPHINE SULFATE 2 MG/ML
2 INJECTION, SOLUTION INTRAMUSCULAR; INTRAVENOUS AS NEEDED
Status: CANCELLED | OUTPATIENT
Start: 2024-02-20

## 2024-02-20 RX ORDER — KETOROLAC TROMETHAMINE 30 MG/ML
30 INJECTION, SOLUTION INTRAMUSCULAR; INTRAVENOUS ONCE
Status: CANCELLED | OUTPATIENT
Start: 2024-02-20 | End: 2024-02-20

## 2024-02-20 RX ORDER — KETOROLAC TROMETHAMINE 30 MG/ML
30 INJECTION, SOLUTION INTRAMUSCULAR; INTRAVENOUS ONCE AS NEEDED
Status: CANCELLED | OUTPATIENT
Start: 2024-02-20 | End: 2024-02-25

## 2024-02-20 RX ORDER — ONDANSETRON HYDROCHLORIDE 2 MG/ML
4 INJECTION, SOLUTION INTRAVENOUS AS NEEDED
Status: CANCELLED | OUTPATIENT
Start: 2024-02-20

## 2024-02-20 RX ORDER — OXYCODONE AND ACETAMINOPHEN 5; 325 MG/1; MG/1
1 TABLET ORAL EVERY 6 HOURS PRN
Status: CANCELLED | OUTPATIENT
Start: 2024-02-20

## 2024-02-21 ENCOUNTER — ANESTHESIA EVENT (OUTPATIENT)
Dept: ENDOCRINOLOGY | Facility: CLINIC | Age: 34
End: 2024-02-21
Payer: COMMERCIAL

## 2024-02-21 ENCOUNTER — HOSPITAL ENCOUNTER (OUTPATIENT)
Dept: ENDOCRINOLOGY | Facility: CLINIC | Age: 34
Discharge: HOME | End: 2024-02-21
Payer: COMMERCIAL

## 2024-02-21 ENCOUNTER — ANESTHESIA (OUTPATIENT)
Dept: ENDOCRINOLOGY | Facility: CLINIC | Age: 34
End: 2024-02-21
Payer: COMMERCIAL

## 2024-02-21 VITALS
HEART RATE: 73 BPM | WEIGHT: 193.3 LBS | OXYGEN SATURATION: 100 % | DIASTOLIC BLOOD PRESSURE: 81 MMHG | BODY MASS INDEX: 27.67 KG/M2 | TEMPERATURE: 97.5 F | SYSTOLIC BLOOD PRESSURE: 122 MMHG | RESPIRATION RATE: 18 BRPM | HEIGHT: 70 IN

## 2024-02-21 DIAGNOSIS — Z31.41 FERTILITY TESTING: ICD-10-CM

## 2024-02-21 PROBLEM — Z98.890 PONV (POSTOPERATIVE NAUSEA AND VOMITING): Status: ACTIVE | Noted: 2024-02-21

## 2024-02-21 PROBLEM — R11.2 PONV (POSTOPERATIVE NAUSEA AND VOMITING): Status: ACTIVE | Noted: 2024-02-21

## 2024-02-21 LAB — PREGNANCY TEST URINE, POC: NEGATIVE

## 2024-02-21 PROCEDURE — 2500000004 HC RX 250 GENERAL PHARMACY W/ HCPCS (ALT 636 FOR OP/ED)

## 2024-02-21 PROCEDURE — 58555 HYSTEROSCOPY DX SEP PROC: CPT | Mod: GC | Performed by: OBSTETRICS & GYNECOLOGY

## 2024-02-21 PROCEDURE — 3700000002 HC GENERAL ANESTHESIA TIME - EACH INCREMENTAL 1 MINUTE: Performed by: OBSTETRICS & GYNECOLOGY

## 2024-02-21 PROCEDURE — 81025 URINE PREGNANCY TEST: CPT | Performed by: STUDENT IN AN ORGANIZED HEALTH CARE EDUCATION/TRAINING PROGRAM

## 2024-02-21 PROCEDURE — 7100000009 HC PHASE TWO TIME - INITIAL BASE CHARGE: Performed by: OBSTETRICS & GYNECOLOGY

## 2024-02-21 PROCEDURE — 58555 HYSTEROSCOPY DX SEP PROC: CPT | Performed by: OBSTETRICS & GYNECOLOGY

## 2024-02-21 PROCEDURE — 3700000001 HC GENERAL ANESTHESIA TIME - INITIAL BASE CHARGE: Performed by: OBSTETRICS & GYNECOLOGY

## 2024-02-21 PROCEDURE — 7100000010 HC PHASE TWO TIME - EACH INCREMENTAL 1 MINUTE: Performed by: OBSTETRICS & GYNECOLOGY

## 2024-02-21 RX ORDER — KETOROLAC TROMETHAMINE 30 MG/ML
30 INJECTION, SOLUTION INTRAMUSCULAR; INTRAVENOUS ONCE
Status: DISCONTINUED | OUTPATIENT
Start: 2024-02-21 | End: 2024-02-22 | Stop reason: HOSPADM

## 2024-02-21 RX ORDER — KETOROLAC TROMETHAMINE 30 MG/ML
INJECTION, SOLUTION INTRAMUSCULAR; INTRAVENOUS AS NEEDED
Status: DISCONTINUED | OUTPATIENT
Start: 2024-02-21 | End: 2024-02-21

## 2024-02-21 RX ORDER — ONDANSETRON HYDROCHLORIDE 2 MG/ML
INJECTION, SOLUTION INTRAVENOUS AS NEEDED
Status: DISCONTINUED | OUTPATIENT
Start: 2024-02-21 | End: 2024-02-21

## 2024-02-21 RX ORDER — ACETAMINOPHEN 325 MG/1
650 TABLET ORAL ONCE AS NEEDED
Status: DISCONTINUED | OUTPATIENT
Start: 2024-02-21 | End: 2024-02-22 | Stop reason: HOSPADM

## 2024-02-21 RX ORDER — MORPHINE SULFATE 2 MG/ML
2 INJECTION, SOLUTION INTRAMUSCULAR; INTRAVENOUS AS NEEDED
Status: DISCONTINUED | OUTPATIENT
Start: 2024-02-21 | End: 2024-02-22 | Stop reason: HOSPADM

## 2024-02-21 RX ORDER — FENTANYL CITRATE 50 UG/ML
INJECTION, SOLUTION INTRAMUSCULAR; INTRAVENOUS AS NEEDED
Status: DISCONTINUED | OUTPATIENT
Start: 2024-02-21 | End: 2024-02-21

## 2024-02-21 RX ORDER — OMEGA-3-ACID ETHYL ESTERS 1 G/1
1 CAPSULE, LIQUID FILLED ORAL DAILY
COMMUNITY

## 2024-02-21 RX ORDER — KETOROLAC TROMETHAMINE 30 MG/ML
30 INJECTION, SOLUTION INTRAMUSCULAR; INTRAVENOUS ONCE AS NEEDED
Status: DISCONTINUED | OUTPATIENT
Start: 2024-02-21 | End: 2024-02-22 | Stop reason: HOSPADM

## 2024-02-21 RX ORDER — MIDAZOLAM HYDROCHLORIDE 1 MG/ML
INJECTION, SOLUTION INTRAMUSCULAR; INTRAVENOUS AS NEEDED
Status: DISCONTINUED | OUTPATIENT
Start: 2024-02-21 | End: 2024-02-21

## 2024-02-21 RX ORDER — OXYCODONE AND ACETAMINOPHEN 5; 325 MG/1; MG/1
1 TABLET ORAL EVERY 6 HOURS PRN
Status: DISCONTINUED | OUTPATIENT
Start: 2024-02-21 | End: 2024-02-22 | Stop reason: HOSPADM

## 2024-02-21 RX ORDER — ONDANSETRON HYDROCHLORIDE 2 MG/ML
4 INJECTION, SOLUTION INTRAVENOUS AS NEEDED
Status: DISCONTINUED | OUTPATIENT
Start: 2024-02-21 | End: 2024-02-22 | Stop reason: HOSPADM

## 2024-02-21 RX ORDER — PROPOFOL 10 MG/ML
INJECTION, EMULSION INTRAVENOUS CONTINUOUS PRN
Status: DISCONTINUED | OUTPATIENT
Start: 2024-02-21 | End: 2024-02-21

## 2024-02-21 RX ORDER — CEFAZOLIN 1 G/1
INJECTION, POWDER, FOR SOLUTION INTRAVENOUS AS NEEDED
Status: DISCONTINUED | OUTPATIENT
Start: 2024-02-21 | End: 2024-02-21

## 2024-02-21 RX ORDER — VITAMIN E MIXED 400 UNIT
400 CAPSULE ORAL 2 TIMES DAILY
COMMUNITY

## 2024-02-21 RX ORDER — HYDROCODONE BITARTRATE AND ACETAMINOPHEN 5; 325 MG/1; MG/1
1 TABLET ORAL ONCE AS NEEDED
Status: DISCONTINUED | OUTPATIENT
Start: 2024-02-21 | End: 2024-02-22 | Stop reason: HOSPADM

## 2024-02-21 RX ADMIN — PROPOFOL 200 MCG/KG/MIN: 10 INJECTION, EMULSION INTRAVENOUS at 10:53

## 2024-02-21 RX ADMIN — KETOROLAC TROMETHAMINE 30 MG: 30 INJECTION INTRAMUSCULAR; INTRAVENOUS at 11:27

## 2024-02-21 RX ADMIN — FENTANYL CITRATE 100 MCG: 50 INJECTION, SOLUTION INTRAMUSCULAR; INTRAVENOUS at 10:48

## 2024-02-21 RX ADMIN — ONDANSETRON 4 MG: 2 INJECTION, SOLUTION INTRAMUSCULAR; INTRAVENOUS at 11:27

## 2024-02-21 RX ADMIN — CEFAZOLIN 2 G: 330 INJECTION, POWDER, FOR SOLUTION INTRAMUSCULAR; INTRAVENOUS at 11:32

## 2024-02-21 RX ADMIN — SODIUM CHLORIDE, POTASSIUM CHLORIDE, SODIUM LACTATE AND CALCIUM CHLORIDE: 600; 310; 30; 20 INJECTION, SOLUTION INTRAVENOUS at 10:43

## 2024-02-21 RX ADMIN — MIDAZOLAM 2 MG: 1 INJECTION INTRAMUSCULAR; INTRAVENOUS at 10:48

## 2024-02-21 ASSESSMENT — PAIN SCALES - GENERAL
PAINLEVEL_OUTOF10: 0 - NO PAIN

## 2024-02-21 ASSESSMENT — COLUMBIA-SUICIDE SEVERITY RATING SCALE - C-SSRS
2. HAVE YOU ACTUALLY HAD ANY THOUGHTS OF KILLING YOURSELF?: NO
6. HAVE YOU EVER DONE ANYTHING, STARTED TO DO ANYTHING, OR PREPARED TO DO ANYTHING TO END YOUR LIFE?: NO
1. IN THE PAST MONTH, HAVE YOU WISHED YOU WERE DEAD OR WISHED YOU COULD GO TO SLEEP AND NOT WAKE UP?: NO

## 2024-02-21 ASSESSMENT — PAIN - FUNCTIONAL ASSESSMENT
PAIN_FUNCTIONAL_ASSESSMENT: 0-10

## 2024-02-21 NOTE — DISCHARGE INSTRUCTIONS
[facility]  [address]  [address]  Tel: [phone]   Fax: [phone]    Home Going Instructions after Hysteroscpy:    Activity:   We do not recommend any exercise on the day of your hysteroscopy.  You may return to work the following day.  You may have light bleeding or spotting for several days after procedure.   Use only sanitary pads for bleeding, do not use tampons until you have no further bleeding.   Please abstain from intercourse until you have no further bleeding.     Anesthesia:  Drink small amounts of liquids initially and then slowly increase your intake of food. Drinking fluids will keep your bowels regular.   Avoid foods that are sweet, spicy or hard to digest today.  If you feel nauseated, rest your stomach for one hour, and then try drinking clear liquids again.  You may take a stool softener or miralax/milk of magnesia to help with constipation that may occur after anesthesia.  Please make sure a responsible adult is with you for at least 24 hours after surgery and do not drive or make important decisions during this time. Anesthesia may affect your judgment, coordination, and reaction time.    Pain:  If you experience any post-op pain, pain can be managed by taking Ibuprofen and/or Tylenol.    Follow up:  Follow up with your primary nurse a few days after your procedure to check in and make sure you know what your next steps are.   A post-operative visit with your physician is not necessary after procedure.    When to call your provider:  Vaginal bleeding that is more than a normal period that doesn't taper down.  Bleeding through 1 sanitary pad an hour.  Any clots the size of an egg or bigger.  Fever of 100.4 or higher with chills.  Unusual or foul smelling discharge.  Worsening abdominal pain.    Amabr Monreal    9:43 AM

## 2024-02-21 NOTE — NURSING NOTE
Patient discharged to home in stable condition via wheelchair to RIDE HOME: Partner's car. Discharge instructions given and concerns addressed.

## 2024-02-21 NOTE — ANESTHESIA PREPROCEDURE EVALUATION
Patient: Clotilde Connor    Procedure Information       Date/Time: 02/21/24 1030    Scheduled providers: Alesia Simeon MD; Saravanan Cain MD; CALDERON Mckeon    Procedure: HYSTEROSCOPY DIAGNOSTIC    [facility]            Relevant Problems   Anesthesia   (+) PONV (postoperative nausea and vomiting)      Hematology   (+) Anemia       Clinical information reviewed:   Tobacco  Allergies  Meds   Med Hx  Surg Hx   Fam Hx  Soc Hx        NPO/Void Status  Carbohydrate Drink Given Prior to Surgery? : N  Date of Last Liquid: 02/20/24  Time of Last Liquid: 2100  Date of Last Solid: 02/20/24  Time of Last Solid: 2100  Last Intake Type: Clear fluids           Past Medical History:   Diagnosis Date    Cervical cancer (CMS/HCC) 2022    PONV (postoperative nausea and vomiting)       Past Surgical History:   Procedure Laterality Date    CERVICAL BIOPSY      ECTOPIC PREGNANCY SURGERY      OVARY SURGERY       Social History     Tobacco Use    Smoking status: Never    Smokeless tobacco: Never   Substance Use Topics    Alcohol use: Never    Drug use: Never      Current Outpatient Medications   Medication Instructions    estradiol (Vivelle-DOT) 0.1 mg/24 hr patch 1 patch, transdermal, 2 times weekly    Junel FE 1.5/30, 28, 1.5 mg-30 mcg (21)/75 mg (7) tablet 1 tablet, oral, Daily, as directed    miSOPROStoL (CYTOTEC) 400 mcg, vaginal, Once, Insert morning of procedure    omega-3 acid ethyl esters (LOVAZA) 1 g, oral, Daily    pentoxifylline (TRENTAL) 400 mg, oral, 2 times daily, Do not crush, chew, or split.    prenatal no115/iron/folic acid (PRENATAL 19 ORAL) oral    progesterone (PROMETRIUM) 200 mg, oral, Daily, 12 days per month    vitamin E 400 Units, oral, 2 times daily      Allergies   Allergen Reactions    Shrimp Hives and Swelling     shrimp        Chemistry    Lab Results   Component Value Date/Time     03/08/2023 0823    K 4.0 03/08/2023 0823      "03/08/2023 0823    CO2 27 03/08/2023 0823    BUN 12 03/08/2023 0823    CREATININE 0.76 03/08/2023 0823    Lab Results   Component Value Date/Time    CALCIUM 9.1 03/08/2023 0823    ALKPHOS 49 06/25/2022 0000    AST 14 06/25/2022 0000    ALT 16 06/25/2022 0000    BILITOT 0.6 06/25/2022 0000          Lab Results   Component Value Date/Time    WBC 3.2 (L) 03/08/2023 0823    HGB 12.6 03/08/2023 0823    HCT 38.6 03/08/2023 0823     03/08/2023 0823     Lab Results   Component Value Date/Time    PROTIME 11.4 06/25/2022 0000    INR 1.0 06/25/2022 0000     No results found for this or any previous visit (from the past 4464 hour(s)).  No results found for this or any previous visit from the past 1095 days.       Visit Vitals  /78   Pulse 61   Temp 36.6 °C (97.9 °F) (Oral)   Resp 18   Ht 1.778 m (5' 10\")   Wt 87.7 kg (193 lb 4.8 oz)   BMI 27.74 kg/m²   OB Status Having periods   Smoking Status Never   BSA 2.08 m²        Physical Exam    Airway  Mallampati: II  TM distance: >3 FB  Neck ROM: full     Cardiovascular   Rhythm: regular  Rate: normal     Dental - normal exam     Pulmonary   Breath sounds clear to auscultation     Abdominal - normal exam              Anesthesia Plan    History of general anesthesia?: yes  History of complications of general anesthesia?: no    ASA 2     general   (TIVA)  intravenous induction   Anesthetic plan and risks discussed with patient.    Plan discussed with CAA and CRNA.        "

## 2024-02-21 NOTE — ANESTHESIA POSTPROCEDURE EVALUATION
Patient: Clotilde Connor    Procedure Summary       Date: 02/21/24 Room / Location:  [facility] [place]    Anesthesia Start: 1043 Anesthesia Stop: 1138    Procedure: HYSTEROSCOPY DIAGNOSTIC Diagnosis: Fertility testing    Scheduled Providers: Alesia Simeon MD; Saravanan Cain MD; CALDERON Mckeon Responsible Provider: Saravanan Cain MD    Anesthesia Type: general ASA Status: 2            Anesthesia Type: general    Vitals Value Taken Time   /81 02/21/24 1205   Temp 36.4 °C (97.5 °F) 02/21/24 1135   Pulse 73 02/21/24 1205   Resp 18 02/21/24 1205   SpO2 100 % 02/21/24 1205       Anesthesia Post Evaluation    Patient location during evaluation: PACU  Patient participation: complete - patient participated  Level of consciousness: awake and alert  Pain management: adequate  Airway patency: patent  Cardiovascular status: acceptable and hemodynamically stable  Respiratory status: acceptable, spontaneous ventilation and nonlabored ventilation  Hydration status: acceptable  Postoperative Nausea and Vomiting: none        There were no known notable events for this encounter.

## 2024-02-21 NOTE — PROGRESS NOTES
IVF Procedure Area H&P    Ms. Clotilde Connor is a 33 y.o. F who presents for diagnostic hysteroscopy under anesthesia.   Interval history reviewed and affirmed as up to date.      MedHx:   Past Medical History:   Diagnosis Date    Cervical cancer (CMS/HCC) 2022       SurgHx:   Past Surgical History:   Procedure Laterality Date    CERVICAL BIOPSY      ECTOPIC PREGNANCY SURGERY      OVARY SURGERY         Meds:   Current Outpatient Medications on File Prior to Encounter   Medication Sig Dispense Refill    estradiol (Vivelle-DOT) 0.1 mg/24 hr patch Place 1 patch over 96 hours on the skin 2 times a week. 8 patch 11    pentoxifylline (Trental) 400 mg ER tablet Take 1 tablet (400 mg) by mouth 2 times a day. Do not crush, chew, or split. 60 tablet 3    prenatal no115/iron/folic acid (PRENATAL 19 ORAL) Take by mouth.      progesterone (Prometrium) 200 mg capsule Take 1 capsule (200 mg) by mouth once daily. 12 days per month 30 capsule 11    Junel FE 1.5/30, 28, 1.5 mg-30 mcg (21)/75 mg (7) tablet Take 1 tablet by mouth once daily. as directed      miSOPROStoL (Cytotec) 200 mcg tablet Insert 2 tablets (400 mcg) into the vagina 1 time for 1 dose. Insert morning of procedure 2 tablet 0    omega-3 acid ethyl esters (Lovaza) 1 gram capsule Take 1 capsule (1 g) by mouth once daily.      vitamin E 180 mg (400 unit) capsule Take 1 capsule (400 Units) by mouth 2 times a day.       No current facility-administered medications on file prior to encounter.       Allergies:   Allergies   Allergen Reactions    Shrimp Hives and Swelling     shrimp       ROS: negative apart from what is indicated above    PE:   Gen: AA x 3   Resp: No labored breathing  Abdomen: soft, non-tender, non-distended    A/P: Ms. Clotilde Connor is a 33 y.o. F who presents for above procedure under anesthesia in the IVF procedure area. Okay to proceed with above stated procedure.    Monserrat Bridges

## 2024-02-21 NOTE — PROGRESS NOTES
Patient ID: Clotilde Connor is a 33 y.o. female.    Hysteroscopy diagnostic    Date/Time: 2/21/2024 11:20 AM    Performed by: Alesia Simeon MD  Authorized by: Bev Mayer MD    Consent:     Consent obtained:  Verbal and written    Consent given by:  Patient    Risks, benefits, and alternatives were discussed: yes      Risks discussed:  Bleeding, infection and pain  Universal protocol:     Procedure explained and questions answered to patient or proxy's satisfaction: yes      Relevant documents present and verified: yes      Test results available: yes      Imaging studies available: yes      Required blood products, implants, devices, and special equipment available: yes      Immediately prior to procedure, a time out was called: yes      Patient identity confirmed:  Verbally with patient, arm band and hospital-assigned identification number  Pre-procedure details:     Skin preparation:  Povidone-iodine  Procedure specific details:      Procedure: Diagnostic Hysteroscopy   Preop diagnosis: Cervical stenosis status post radiation treatment, fertility testing  Post op diagnosis: Same, uterine perforation anterior midline   Assistant: Yuliana    Anesthesia: General   IV: None   EBL: 15 cc  Specimens: None   Complications: None   Risks benefits and alternatives of the procedure explained to the patient and informed consent was obtained. Urine pregnancy test was performed and was negative. Time out was performed. The patient was placed in the dorsal lithotomy position and a sterile speculum was placed in the vagina. The cervix was sterilized with Betadine x3. Paracervical block with lidocaine 1% was administered.   Tenaculum: Yes  Dilation: YES  The os finder was used to locate a pinpoint cervical os located to the left of midline at 5 o'clock. This was serially dilated to be able to pass the hysteroscope into the scarred and atrophic endocervix. No clear path through the endocervix into the uterine cavity was  noted, therefore the ultrasound was used to guide gentle pressure from the hysteroscope towards the cavity. An anterior midline uterine perforation was noted with bowel visualized. The hysteroscope was removed. There was no free fluid noted in the pelvis on ultrasound and the bladder appeared normal. All instruments were removed. The tenaculum site was noted to be bleeding and abnormal tissue was again noted. Silver nitrate was applied and pressure was held and hemostasis was noted. Patient tolerated the procedure well returned to the recovery area in stable condition.   Findings:   Scarred and stenotic internal os with thin endometrium noted. Anterior uterine perforation. Could not enter or visualize the uterine cavity. All tissues appeared pale and atrophic.   Additional Notes: Post procedure ancef 2 grams was administered.    Attending Attestation: I was physically present for key and critical portions performed by the fellow. I reviewed the fellow's documentation and discussed the patient with the fellow. I agree with the fellow's medical decision making as documented in the fellow's note.   Alesia Simeon MD          Post-procedure details:     Procedure completion:  Tolerated well, no immediate complications

## 2024-03-05 ENCOUNTER — APPOINTMENT (OUTPATIENT)
Dept: PRIMARY CARE | Facility: CLINIC | Age: 34
End: 2024-03-05
Payer: COMMERCIAL

## 2024-03-06 PROBLEM — N93.9 ABNORMAL UTERINE AND VAGINAL BLEEDING, UNSPECIFIED: Status: ACTIVE | Noted: 2023-04-11

## 2024-03-06 PROBLEM — Z92.89 PERSONAL HISTORY OF OTHER MEDICAL TREATMENT: Status: ACTIVE | Noted: 2023-07-11

## 2024-03-06 PROBLEM — Z77.9 OTHER CONTACT WITH AND (SUSPECTED) EXPOSURES HAZARDOUS TO HEALTH: Status: ACTIVE | Noted: 2023-07-11

## 2024-03-06 PROBLEM — R22.31 LOCALIZED SWELLING, MASS AND LUMP, RIGHT UPPER LIMB: Status: ACTIVE | Noted: 2023-04-13

## 2024-03-06 PROBLEM — E28.8 FEMALE HYPERGONADOTROPIC HYPOGONADISM: Status: ACTIVE | Noted: 2023-07-11

## 2024-03-06 PROBLEM — Z85.41 PERSONAL HISTORY OF MALIGNANT NEOPLASM OF CERVIX UTERI: Status: ACTIVE | Noted: 2023-11-29

## 2024-03-06 PROBLEM — Z11.51 ENCOUNTER FOR SCREENING FOR HUMAN PAPILLOMAVIRUS (HPV): Status: ACTIVE | Noted: 2023-07-11

## 2024-03-06 PROBLEM — Z92.3 PERSONAL HISTORY OF IRRADIATION: Status: ACTIVE | Noted: 2023-02-09

## 2024-03-06 PROBLEM — N97.9 FEMALE INFERTILITY: Status: ACTIVE | Noted: 2024-03-06

## 2024-03-12 ENCOUNTER — OFFICE VISIT (OUTPATIENT)
Dept: PRIMARY CARE | Facility: CLINIC | Age: 34
End: 2024-03-12
Payer: COMMERCIAL

## 2024-03-12 ENCOUNTER — LAB (OUTPATIENT)
Dept: LAB | Facility: LAB | Age: 34
End: 2024-03-12
Payer: COMMERCIAL

## 2024-03-12 VITALS
HEART RATE: 73 BPM | OXYGEN SATURATION: 98 % | WEIGHT: 193 LBS | SYSTOLIC BLOOD PRESSURE: 108 MMHG | HEIGHT: 70 IN | BODY MASS INDEX: 27.63 KG/M2 | DIASTOLIC BLOOD PRESSURE: 64 MMHG | RESPIRATION RATE: 16 BRPM | TEMPERATURE: 98.1 F

## 2024-03-12 DIAGNOSIS — Z01.84 IMMUNITY STATUS TESTING: ICD-10-CM

## 2024-03-12 DIAGNOSIS — Z23 REQUIRES TYPHOID VACCINATION: ICD-10-CM

## 2024-03-12 DIAGNOSIS — Z91.018 FOOD ALLERGY: ICD-10-CM

## 2024-03-12 DIAGNOSIS — B54 MALARIA: ICD-10-CM

## 2024-03-12 DIAGNOSIS — Z71.84 TRAVEL ADVICE ENCOUNTER: Primary | ICD-10-CM

## 2024-03-12 LAB — HBV SURFACE AB SER-ACNC: 9.7 MIU/ML

## 2024-03-12 PROCEDURE — 86706 HEP B SURFACE ANTIBODY: CPT

## 2024-03-12 PROCEDURE — 86735 MUMPS ANTIBODY: CPT

## 2024-03-12 PROCEDURE — 90632 HEPA VACCINE ADULT IM: CPT | Performed by: INTERNAL MEDICINE

## 2024-03-12 PROCEDURE — 99215 OFFICE O/P EST HI 40 MIN: CPT | Performed by: INTERNAL MEDICINE

## 2024-03-12 PROCEDURE — 90472 IMMUNIZATION ADMIN EACH ADD: CPT | Performed by: INTERNAL MEDICINE

## 2024-03-12 PROCEDURE — 1036F TOBACCO NON-USER: CPT | Performed by: INTERNAL MEDICINE

## 2024-03-12 PROCEDURE — 36415 COLL VENOUS BLD VENIPUNCTURE: CPT

## 2024-03-12 PROCEDURE — 90471 IMMUNIZATION ADMIN: CPT | Performed by: INTERNAL MEDICINE

## 2024-03-12 PROCEDURE — 90715 TDAP VACCINE 7 YRS/> IM: CPT | Performed by: INTERNAL MEDICINE

## 2024-03-12 PROCEDURE — 90734 MENACWYD/MENACWYCRM VACC IM: CPT | Performed by: INTERNAL MEDICINE

## 2024-03-12 PROCEDURE — 86765 RUBEOLA ANTIBODY: CPT

## 2024-03-12 RX ORDER — SALMONELLA TYPHI TY21A 6000000000 [CFU]/1
1 CAPSULE, COATED ORAL EVERY OTHER DAY
Qty: 4 CAPSULE | Refills: 0 | Status: SHIPPED | OUTPATIENT
Start: 2024-03-12 | End: 2024-03-20

## 2024-03-12 RX ORDER — ATOVAQUONE AND PROGUANIL HYDROCHLORIDE 250; 100 MG/1; MG/1
TABLET, FILM COATED ORAL
Qty: 16 TABLET | Refills: 0 | Status: SHIPPED | OUTPATIENT
Start: 2024-03-12 | End: 2024-05-30 | Stop reason: ALTCHOICE

## 2024-03-12 RX ORDER — EPINEPHRINE 0.3 MG/.3ML
1 INJECTION SUBCUTANEOUS AS NEEDED
Qty: 2 EACH | Refills: 2 | Status: SHIPPED | OUTPATIENT
Start: 2024-03-12 | End: 2024-05-30 | Stop reason: ALTCHOICE

## 2024-03-12 ASSESSMENT — PATIENT HEALTH QUESTIONNAIRE - PHQ9
1. LITTLE INTEREST OR PLEASURE IN DOING THINGS: NOT AT ALL
2. FEELING DOWN, DEPRESSED OR HOPELESS: NOT AT ALL
SUM OF ALL RESPONSES TO PHQ9 QUESTIONS 1 AND 2: 0

## 2024-03-12 NOTE — PATIENT INSTRUCTIONS
Tdap x1 today. Every 10 years  Meningitis ACWY one dose today, repeat in 5 years as needed  Hepatitis A, one dose today, then one another in 6 months    Start malaria prophylaxis, start taking 1-2 days before travel to [facility], until one week after leaving that area.    Typhoid oral vaccine follow instruction.

## 2024-03-12 NOTE — PROGRESS NOTES
PCP: Josh Lucero MD   I have reviewed existing histories, notes, past medical history, surgical history, social history, family history, med list, allergy list, and immunization, and updated.    HPI:   Here for several reasons:    Review labs, TSH was low. Her other doctor did place referral to see endocrinology. She has not heard yet. I advised her to call them.    Also she is asking for epi pen rx. Has hx of allergy to shrimps. Has not had tested. Will refer to allergy department. Will provide rx for EpiPen.    She has finished with cervical cancer [place]. Has had some vaginal bleeding, and is under investigation, under care of gynecologist.    Will be traveling to [place] (3 days) and [facility] (to attend [facility]) there for 6 days.   [place] leave from [place] on 4/20.   We reviewed CDC advisory regarding travel to these two destinations, regarding general precautions, malaria prophylaxis and vaccine recommended, and as well as routine vaccinations.   She recently had immune status checked for Rubella, and varicella. Has good immunity to these two.  Not clear if has immunity to Measle, mumps, hep b.  Due for tdap.  Hep A is recommended for travel to both countries  Malaria prophylaxis is recommended for going to the [facility].  Typhoid vaccine is recommended for both countries.  Meningitis is also recommended for going to [facility].  Discussed the schedule of all vaccines relative  Will check immune status to Hep B, MM. If lacks of immunity, will provide vaccine.      Lab Results   Component Value Date    WBC 3.2 (L) 03/08/2023    HGB 12.6 03/08/2023    HCT 38.6 03/08/2023     03/08/2023    ALT 16 06/25/2022    AST 14 06/25/2022     03/08/2023    K 4.0 03/08/2023     03/08/2023    CREATININE 0.76 03/08/2023    BUN 12 03/08/2023    CO2 27 03/08/2023    TSH 0.01 (L) 01/16/2024    INR 1.0 06/25/2022     Physical exam:  /64   Pulse 73   Temp 36.7 °C (98.1 °F)   Resp 16   Ht 1.778 m (5'  "10\")   Wt 87.5 kg (193 lb)   SpO2 98%   BMI 27.69 kg/m²        Assessments/orders:   1. Travel advice encounter  Meningococcal ACWY vaccine (MENVEO), Tdap vaccine, age 7 years and older  (BOOSTRIX), Hepatitis A vaccine, age 19 years and greater (HAVRIX)      2. Food allergy  EPINEPHrine (Epipen) 0.3 mg/0.3 mL injection syringe, Referral to Allergy      3. Immunity status testing  Hepatitis B Surface Antibody, Mumps Antibody, IgG, Rubeola Antibody, IgG      4. Requires typhoid vaccination  typhoid (Vivotif) DR capsule vaccine      5. Malaria  atovaquone-proguaniL (Malarone) 250-100 mg tablet        Hep A vaccine dose 1, tdap, meningitis ACYW today.   Return for Hep A dose 2 in 6 months  repeat meningitis ACYW in 5 years if needed    repeat Vifotif in 5 years as needed.    Visit length is prolonged due to travel planning.              "

## 2024-03-13 LAB
MEV IGG SER QL IA: POSITIVE
MUMPS IGG ANTIBODY INDEX: 0.8 IA
MUV IGG SER IA-ACNC: NEGATIVE
RUBEOLA IGG ANTIBODY INDEX: 2.4 IA

## 2024-03-14 ENCOUNTER — OFFICE VISIT (OUTPATIENT)
Dept: PRIMARY CARE | Facility: CLINIC | Age: 34
End: 2024-03-14
Payer: COMMERCIAL

## 2024-03-14 DIAGNOSIS — Z23 NEED FOR MMR VACCINE: Primary | ICD-10-CM

## 2024-03-14 PROCEDURE — 90471 IMMUNIZATION ADMIN: CPT | Performed by: INTERNAL MEDICINE

## 2024-03-14 PROCEDURE — 1036F TOBACCO NON-USER: CPT | Performed by: INTERNAL MEDICINE

## 2024-03-14 PROCEDURE — 90707 MMR VACCINE SC: CPT | Performed by: INTERNAL MEDICINE

## 2024-04-08 ENCOUNTER — APPOINTMENT (OUTPATIENT)
Dept: ALLERGY | Facility: CLINIC | Age: 34
End: 2024-04-08
Payer: COMMERCIAL

## 2024-04-11 ENCOUNTER — CONSULT (OUTPATIENT)
Dept: ALLERGY | Facility: CLINIC | Age: 34
End: 2024-04-11
Payer: COMMERCIAL

## 2024-04-11 ENCOUNTER — LAB (OUTPATIENT)
Dept: LAB | Facility: LAB | Age: 34
End: 2024-04-11
Payer: COMMERCIAL

## 2024-04-11 VITALS
BODY MASS INDEX: 27.35 KG/M2 | HEART RATE: 75 BPM | DIASTOLIC BLOOD PRESSURE: 76 MMHG | SYSTOLIC BLOOD PRESSURE: 115 MMHG | HEIGHT: 70 IN | WEIGHT: 191 LBS

## 2024-04-11 DIAGNOSIS — T78.1XXA ADVERSE REACTION TO FOOD, INITIAL ENCOUNTER: Primary | ICD-10-CM

## 2024-04-11 DIAGNOSIS — J30.1 SEASONAL ALLERGIC RHINITIS DUE TO POLLEN: ICD-10-CM

## 2024-04-11 DIAGNOSIS — T78.1XXA ADVERSE REACTION TO FOOD, INITIAL ENCOUNTER: ICD-10-CM

## 2024-04-11 DIAGNOSIS — J30.2 SEASONAL ALLERGIC RHINITIS DUE TO FUNGAL SPORES: ICD-10-CM

## 2024-04-11 DIAGNOSIS — J30.89 ALLERGIC RHINITIS DUE TO DUST MITE: ICD-10-CM

## 2024-04-11 DIAGNOSIS — J30.81 ALLERGIC RHINITIS DUE TO ANIMAL HAIR AND DANDER: ICD-10-CM

## 2024-04-11 PROCEDURE — 95004 PERQ TESTS W/ALRGNC XTRCS: CPT | Performed by: ALLERGY & IMMUNOLOGY

## 2024-04-11 PROCEDURE — 36415 COLL VENOUS BLD VENIPUNCTURE: CPT

## 2024-04-11 PROCEDURE — 86003 ALLG SPEC IGE CRUDE XTRC EA: CPT

## 2024-04-11 PROCEDURE — 99204 OFFICE O/P NEW MOD 45 MIN: CPT | Performed by: ALLERGY & IMMUNOLOGY

## 2024-04-11 RX ORDER — FLUTICASONE PROPIONATE 50 MCG
2 SPRAY, SUSPENSION (ML) NASAL DAILY
Qty: 16 G | Refills: 11 | Status: SHIPPED | OUTPATIENT
Start: 2024-04-11 | End: 2024-04-11 | Stop reason: SDUPTHER

## 2024-04-11 ASSESSMENT — ENCOUNTER SYMPTOMS
MUSCULOSKELETAL NEGATIVE: 1
GASTROINTESTINAL NEGATIVE: 1
ALLERGIC/IMMUNOLOGIC NEGATIVE: 1
CONSTITUTIONAL NEGATIVE: 1
RESPIRATORY NEGATIVE: 1
CARDIOVASCULAR NEGATIVE: 1
EYES NEGATIVE: 1
HEMATOLOGIC/LYMPHATIC NEGATIVE: 1

## 2024-04-11 NOTE — PATIENT INSTRUCTIONS
It was nice to meet you today    Environmental Allergy Testing:  Test Results (wheal/flare in mm)    Controls  Controls  Histamine:   Negative: 0    Trees:  Trees  American Beech: 0  Ottoniel: 0  Birch:   Black [place]: 0  [place]: 0  [place]: 0  [place]: 0  Elm: 0  [place]: 0  Maple: 0  [place]: 0  [place]: 0  [place]: 0  White poplar: 0     Grasses:  Grass  Bahia: 0  Bermuda: 0  Juan M: 0  KORT Grass Mix:   Sweet Vernal: 6/15    Weeds:  Weeds  Cocklebur: 0  Dandelion: 5/10  English Plantain: 0  Goldenrod: 0  Lambs Quarters: 5/10  Mugwort: 0  Pigweed: 0  Ragweed Mix: 5/10  Russian Thistle: 0  Yellow Dock: 0     Molds:  Molds  Alternaria:   Aspergillus: 0  Cladosporium:   Helminthosporium: 0  Penicillium: 0  Stemphyllium:     Animals/Dust Mite:  Animals  Cat: 0  AP do  Culp Do  Mouse: 0  Cockroach: 0  Dust Mite F: 0  Dust Mite P: 3/5     Food Allergy Test Results  Shellfish  Clam: 0  Crab: 0  Lobster: 0  Shrimp: 0         Please have labs drawn. Please note that some labs will come back sooner than others.  We will contact you when all labs have returned so that we can discuss results.     Your skin testing today was positive to tree, grass, weed pollen, mold, dust mite.  Please see below for environmental allergen avoidance recommendations.      You may use Flonase 2 sprays each nostril once daily    Technique for nasal spray administration:  First, look slightly down, breathe normally, you do not need to sniff while you spray.  To use the nose spray, place the tip in your nose with the opposite hand (left hand, right side of nose, right hand, left side of nose), and aim or point toward the outside of the nose.  Do not sniff or snort medication afterwards as this can cause most of the medication to be swallowed.  Rather, dab your nose with a tissue if any runs out.    You may use Cetirizine (Zyrtec) 10 mg once daily as needed     Thank you for your visit to the [facility].  Part of a successful visit is taking home the information you learned today and using it to make things better.  These take home instructions are to help you, if you have questions or concerns, please contact us.     Please see below for recommendations on environmental allergy control or modification:     Pollen Avoidance--If you are sensitive to pollen, there are a few tips to help limit, but          not avoid, exposure.     Minimize outdoor activity between 5am-10am, pollen levels are highest at this time.       Keep car windows closed when traveling.     Close house windows at night, use the air conditioning if necessary.     Check the pollen count to know what pollen is outside (http://aaaai.org/nab).     Dust mite control.            1.  Dust mite proof covers/encasing on the mattress, pillow and box spring.            2.  Wash sheets and bed linens in hot water each week.          3.  Vacuum carpets in bedroom each week.          4.  Dust or wipe down any hard surfaces.          5.  Avoid using a humidifier in the bedroom, and          Mold Avoidance.  Mold grows in damp or humid places.  The best way to avoid          environmental molds is to avoid places they grow such as compost piles, decaying          leaf piles and excavation sites.  If you know of any mold in your home, a dilute          bleach solution (1 cup bleach to one gallon of water) can help clean up the mold.            This should be done by a person who is not sensitive.  If there is a water leak, you          should have this fixed to prevent more moisture problems.            Limit Cigarette smoke exposure.  Smoking and second hand smoke can irritate the          nose and sinuses.  You and the people around you will benefit from avoiding          cigarette smoke.

## 2024-04-11 NOTE — LETTER
April 12, 2024     Josh Lucero MD  [address]  [place] [facility] [address]    Patient: Clotilde Connor   YOB: 1990   Date of Visit: 4/11/2024       Dear Dr. Josh Lucero MD:    Thank you for referring Clotilde Connor to me for evaluation. Below are my notes for this consultation.  If you have questions, please do not hesitate to call me. I look forward to following your patient along with you.       Sincerely,     Princess YESENIA Woods MD      CC: No Recipients  ______________________________________________________________________________________    Clotilde Connor presents for initial evaluation today.      Clotilde Connor was seen at the request of Josh Lucero MD for a chief complaint of concern for food and environmental allergy; a report with my findings is being sent via written or electronic means to Josh Lucero MD with my recommendations for treatment    She provides the following history:    She notes that she had a reaction to shrimp around age 25.  She notes that she touched cocktail shrimp at a party briefly and developed a rash on her hands.  The next time that she picked up shrimp, she developed a rash on her hand.  On another occasion, her  ate shrimp, kissed her hand, and she developed a rash in the area.  Her last reaction was touching a pizza that had shrimp on it and developing a rash on her hand.  She used to eat shrimp prior to age 25 without a problem.  She has also tried crab and lobster prior to age 25.  Since this time, she has not eaten any shellfish.  She tolerates finned fish with the no problem. She denies other food reactions.     Rhinitis: She gets itchy eyes, runny nose, sneezing, nasal congestion in fall and uses an OTC antihistamine as needed.     Asthma: She reports that she had childhood asthma.  She last used an inhaler age 3 or 4     Eczema: Denies    Food allergy: Denies    Venom allergy: Denies    Drug allergy:  "Denies    Environmental History:  Type of home:  House  Pets in the house: Cat (2 ragdoll cats)  Mold or moisture in the home: None  Bedroom catherine: Hardwood  Dust mite covers on bed:  No  Cigarette exposure in the home:  No  Occupation/School: Stay     Pertinent Allergy/Immunology family history:  Mom with allergic rhinitis     Review of Systems   Constitutional: Negative.    HENT: Negative.     Eyes: Negative.    Respiratory: Negative.     Cardiovascular: Negative.    Gastrointestinal: Negative.    Musculoskeletal: Negative.    Skin: Negative.    Allergic/Immunologic: Negative.    Hematological: Negative.         Vital signs:  /76   Pulse 75   Ht 1.778 m (5' 10\")   Wt 86.6 kg (191 lb)   BMI 27.41 kg/m²     Physical Exam:  GENERAL: Alert, oriented and in no acute distress.     HEENT: EYES: No conjunctival injection or cobblestoning. Nose: nasal turbinates edematous bilaterally.  There is no mucous stranding, polyps, or blood    noted. EARS: Tympanic membranes are clear. MOUTH: moist and pink with no exudates, ulcers, or thrush. NECK: is supple, without adenopathy.  No upper airway stridor noted.       HEART: regular rate and rhythm.       LUNGS: Clear to auscultation bilaterally. No wheezing, rhonchi or rales.        ABDOMEN: Positive bowel sounds, soft, nontender, nondistended.       EXTREMITIES: No clubbing or edema.        NEURO:  Normal affect.  Gait normal.      SKIN: No rash, hives, or angioedema noted    Environmental Allergy Testing:  Test Results (wheal/flare in mm)    Controls  Controls  Histamine: 9/25  Negative: 0    Trees:  Trees  American Beech: 0  Ottoniel: 0  Birch: 8/30  Black [place]: 0  [place]: 0  [place]: 0  Eastern [place]: 0  Elm: 0  [place]: 0  Maple: 0  [place]: 0  [place]: 0  [place]: 0  White poplar: 0     Grasses:  Grass  Bahia: 0  [place]: 0  Juan M: 0  KORT Grass Mix: 6/20  Sweet Vernal: 6/15    Weeds:  Weeds  Cocklebur: 0  Dandelion: 5/10  English Plantain: 0  Goldenrod: " 0  Lambs Quarters: 5/10  Mugwort: 0  Pigweed: 0  Ragweed Mix: /10  Russian Thistle: 0  Yellow Dock: 0     Molds:  Molds  Alternaria:   Aspergillus: 0  Cladosporium:   Helminthosporium: 0  Penicillium: 0  Stemphyllium:     Animals/Dust Mite:  Animals  Cat: 0  AP do  Culp Do  Mouse: 0  Cockroach: 0  Dust Mite F: 0  Dust Mite P: 3/5     Food Allergy Test Results  Shellfish  Clam: 0  Crab: 0  Lobster: 0  Shrimp: 0       Impression:  1. Adverse reaction to food, initial encounter    2. Seasonal allergic rhinitis due to pollen    3. Allergic rhinitis due to animal hair and dander    4. Seasonal allergic rhinitis due to fungal spores    5. Allergic rhinitis due to dust mite      Assessment and Plan:  Adverse reaction to food, initial encounter: Allergy skin prick testing to shellfish was negative.  Will further evaluate with shellfish specific IgE testing.  If negative, can consider reintroduction.    Allergic rhinitis, seasonal and perennial: Environmental allergy testing today positive to tree pollen, grass pollen, weed pollen, mold, dust mite.  Reviewed allergen avoidance measures.  May use Flonase 2 sprays each nostril once daily.  We reviewed proper nasal spray administration technique.  May use cetirizine 10 mg once daily as needed.  If she does not achieve adequate control with medication and/or would like to decrease overall medication use, allergen immunotherapy would be an option to consider in the future.

## 2024-04-11 NOTE — PROGRESS NOTES
Clotilde Connor presents for initial evaluation today.      Clotilde Connor was seen at the request of Josh Lucero MD for a chief complaint of concern for food and environmental allergy; a report with my findings is being sent via written or electronic means to Josh Lucero MD with my recommendations for treatment    She provides the following history:    She notes that she had a reaction to shrimp around age 25.  She notes that she touched cocktail shrimp at a party briefly and developed a rash on her hands.  The next time that she picked up shrimp, she developed a rash on her hand.  On another occasion, her  ate shrimp, kissed her hand, and she developed a rash in the area.  Her last reaction was touching a pizza that had shrimp on it and developing a rash on her hand.  She used to eat shrimp prior to age 25 without a problem.  She has also tried crab and lobster prior to age 25.  Since this time, she has not eaten any shellfish.  She tolerates finned fish with the no problem. She denies other food reactions.     Rhinitis: She gets itchy eyes, runny nose, sneezing, nasal congestion in fall and uses an OTC antihistamine as needed.     Asthma: She reports that she had childhood asthma.  She last used an inhaler age 3 or 4     Eczema: Denies    Food allergy: Denies    Venom allergy: Denies    Drug allergy: Denies    Environmental History:  Type of home:  House  Pets in the house: Cat (2 ragdoll cats)  Mold or moisture in the home: None  Bedroom catherine: Hardwood  Dust mite covers on bed:  No  Cigarette exposure in the home:  No  Occupation/School: Stay     Pertinent Allergy/Immunology family history:  Mom with allergic rhinitis     Review of Systems   Constitutional: Negative.    HENT: Negative.     Eyes: Negative.    Respiratory: Negative.     Cardiovascular: Negative.    Gastrointestinal: Negative.    Musculoskeletal: Negative.    Skin: Negative.    Allergic/Immunologic: Negative.    Hematological:  "Negative.         Vital signs:  /76   Pulse 75   Ht 1.778 m (5' 10\")   Wt 86.6 kg (191 lb)   BMI 27.41 kg/m²     Physical Exam:  GENERAL: Alert, oriented and in no acute distress.     HEENT: EYES: No conjunctival injection or cobblestoning. Nose: nasal turbinates edematous bilaterally.  There is no mucous stranding, polyps, or blood    noted. EARS: Tympanic membranes are clear. MOUTH: moist and pink with no exudates, ulcers, or thrush. NECK: is supple, without adenopathy.  No upper airway stridor noted.       HEART: regular rate and rhythm.       LUNGS: Clear to auscultation bilaterally. No wheezing, rhonchi or rales.        ABDOMEN: Positive bowel sounds, soft, nontender, nondistended.       EXTREMITIES: No clubbing or edema.        NEURO:  Normal affect.  Gait normal.      SKIN: No rash, hives, or angioedema noted    Environmental Allergy Testing:  Test Results (wheal/flare in mm)    Controls  Controls  Histamine:   Negative: 0    Trees:  Trees  American Beech: 0  Ottoniel: 0  Birch:   Black [place]: 0  [place]: 0  [place]: 0  Eastern [place]: 0  Elm: 0  [place]: 0  Maple: 0  [place]: 0  [place]: 0  [place]: 0  White poplar: 0     Grasses:  Grass  Bahia: 0  Bermuda: 0  Juan M: 0  KORT Grass Mix:   Sweet Vernal: 6/15    Weeds:  Weeds  Cocklebur: 0  Dandelion: 5/10  English Plantain: 0  Goldenrod: 0  Lambs Quarters: 5/10  Mugwort: 0  Pigweed: 0  Ragweed Mix: 5/10  Russian Thistle: 0  Yellow Dock: 0     Molds:  Molds  Alternaria:   Aspergillus: 0  Cladosporium:   Helminthosporium: 0  Penicillium: 0  Stemphyllium:     Animals/Dust Mite:  Animals  Cat: 0  AP do  Culp Do  Mouse: 0  Cockroach: 0  Dust Mite F: 0  Dust Mite P: 3/5     Food Allergy Test Results  Shellfish  Clam: 0  Crab: 0  Lobster: 0  Shrimp: 0       Impression:  1. Adverse reaction to food, initial encounter    2. Seasonal allergic rhinitis due to pollen    3. Allergic rhinitis due to animal hair and dander    4. " Seasonal allergic rhinitis due to fungal spores    5. Allergic rhinitis due to dust mite      Assessment and Plan:  Adverse reaction to food, initial encounter: Allergy skin prick testing to shellfish was negative.  Will further evaluate with shellfish specific IgE testing.  If negative, can consider reintroduction.    Allergic rhinitis, seasonal and perennial: Environmental allergy testing today positive to tree pollen, grass pollen, weed pollen, mold, dust mite.  Reviewed allergen avoidance measures.  May use Flonase 2 sprays each nostril once daily.  We reviewed proper nasal spray administration technique.  May use cetirizine 10 mg once daily as needed.  If she does not achieve adequate control with medication and/or would like to decrease overall medication use, allergen immunotherapy would be an option to consider in the future.

## 2024-04-12 ENCOUNTER — TELEPHONE (OUTPATIENT)
Dept: ALLERGY | Facility: CLINIC | Age: 34
End: 2024-04-12
Payer: COMMERCIAL

## 2024-04-12 DIAGNOSIS — J30.2 SEASONAL ALLERGIC RHINITIS DUE TO FUNGAL SPORES: ICD-10-CM

## 2024-04-12 DIAGNOSIS — J30.81 ALLERGIC RHINITIS DUE TO ANIMAL HAIR AND DANDER: ICD-10-CM

## 2024-04-12 DIAGNOSIS — J30.1 SEASONAL ALLERGIC RHINITIS DUE TO POLLEN: ICD-10-CM

## 2024-04-12 LAB
CRAB IGE QN: <0.1 KU/L
LOBSTER IGE QN: <0.1 KU/L
SHRIMP IGE QN: <0.1 KU/L

## 2024-04-12 RX ORDER — FLUTICASONE PROPIONATE 50 MCG
2 SPRAY, SUSPENSION (ML) NASAL DAILY
Qty: 16 G | Refills: 11 | Status: SHIPPED | OUTPATIENT
Start: 2024-04-12 | End: 2024-04-12 | Stop reason: SDUPTHER

## 2024-04-12 RX ORDER — FLUTICASONE PROPIONATE 50 MCG
2 SPRAY, SUSPENSION (ML) NASAL DAILY
Qty: 48 G | Refills: 9 | Status: SHIPPED | OUTPATIENT
Start: 2024-04-12 | End: 2025-04-12

## 2024-04-12 NOTE — TELEPHONE ENCOUNTER
Please let Clotilde know that shellfish allergy testing is negative.  If desired, she may try shellfish again given negative allergy skin testing and blood testing.  If she prefers an office food challenge, please let us know.

## 2024-04-15 ENCOUNTER — OFFICE VISIT (OUTPATIENT)
Dept: PRIMARY CARE | Facility: CLINIC | Age: 34
End: 2024-04-15
Payer: COMMERCIAL

## 2024-04-15 DIAGNOSIS — Z23 NEED FOR MMR VACCINE: ICD-10-CM

## 2024-04-15 PROBLEM — B54 MALARIA: Status: ACTIVE | Noted: 2024-04-15

## 2024-04-15 PROCEDURE — 1036F TOBACCO NON-USER: CPT | Performed by: INTERNAL MEDICINE

## 2024-04-15 PROCEDURE — 90471 IMMUNIZATION ADMIN: CPT | Performed by: INTERNAL MEDICINE

## 2024-04-15 PROCEDURE — 90707 MMR VACCINE SC: CPT | Performed by: INTERNAL MEDICINE

## 2024-04-15 NOTE — PROGRESS NOTES
Patient came in today for _____MMR VACCINE_____ . Injection was given in the________LEFT ___ deltoid. Injection tolerated well.

## 2024-04-15 NOTE — TELEPHONE ENCOUNTER
Patient reports she reviewed labs via [employer] and has no questions at this time.

## 2024-04-17 NOTE — PROGRESS NOTES
Patient ID: Clotilde Connor is a 33 y.o. female.  Referring Physician: No referring provider defined for this encounter.  Primary Care Provider: Josh Lucero MD    Subjective    Interval History:    bowel movements, bladder and appetite are normal denies pain, she switched to an estrogen patch in December secondary to bothersome cramping and discomfort with her menstrual cycle, she is much happier now, denies lower extremity edema. Notes intermittent right sided breast pain, onset 2 months ago, reports her last PAP was normal and Elizabeth has been doing them every 6 months to 1 year.    She denies fever, chills, chest pain, SOB, nausea, vomiting, diarrhea, constipation, dysuria, or any other concerning signs of symptoms.          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   4 cm lesion with full thickness cervical involvement and early parametrial invasion.     PET/CT performed mid-cranium to midthighs on 3/31/2022:  No extracervical abnormal findings.        Underwent chemo-radiation therapy:  Dr. Rushing at the [facility]:  ChemoRT.  EBRT.  4500 cGy 25 fractions.  Dr. Erasmo Howe, Brachytherapy.  4 fractions, 2800 cGy.  Completed 7/8/2022.     No ovarian transposition.    Did have embryos harvested in [place] prior to chemoRT.    Past Medical History:   Diagnosis Date    Cervical cancer (Multi) 2022    PONV (postoperative nausea and vomiting)      Past Surgical History:   Procedure Laterality Date    CERVICAL BIOPSY      ECTOPIC PREGNANCY SURGERY      OVARY SURGERY           Objective    BSA: 2.05 meters squared  /76 (BP Location: Left arm, Patient Position: Sitting, BP Cuff Size: Adult)   Pulse 60   Temp 36.3 °C (97.3 °F) (Core)   Resp 18   Wt 85 kg (187 lb 6.3 oz)   SpO2 98%   BMI 26.89 kg/m²      Physical Exam  Constitutional:       Appearance: Normal appearance. She is normal weight.   HENT:      Head: Normocephalic.      Mouth/Throat:      Mouth: Mucous membranes are moist.  "  Eyes:      Pupils: Pupils are equal, round, and reactive to light.   Cardiovascular:      Rate and Rhythm: Normal rate and regular rhythm.      Heart sounds: No murmur heard.     No friction rub. No gallop.   Pulmonary:      Effort: Pulmonary effort is normal.      Breath sounds: Normal breath sounds.   Abdominal:      General: Abdomen is flat. Bowel sounds are normal.      Palpations: Abdomen is soft.   Genitourinary:     Comments: Normal external female genitalia without lesions or masses  Speculum exam: Smooth vagina without lesions or masses, cervix with radiation changes, no lesions or masses  Bimanual exam: smooth vagina and cervix without lesions or masses, normal uterus, parametria       Musculoskeletal:         General: No swelling.   Skin:     General: Skin is warm and dry.   Neurological:      Mental Status: She is alert.         Performance Status:  Asymptomatic    Assessment/Plan     Oncology History Overview Note   3/7/2022 Excisional procedure  Final pathology:  2.6 x 2.3 x 2.0 cm specimen submitted.  Invasive carcinoma diagnosed,  most consistent with adenosquamous carcinoma.  Tumor measures 2.6 cm grossly, with \"circumferential involvement of the cone specimen\".  Depth of invasion is at least 8 mm.  Perineural invasion noted as were findings suspicious for LVSI.    ECC:  Non-diagnostic.  3/31/2022 PET/CT No extracervical abnormal findings.      Reported \"indistinct 3.6  x 4.2 cm enhancement and thickening with associated FDG uptake.    ChemoRT.  EBRT.  4500 cGy 25 fractions. in [place].   Completed 6/2022  - 7/8/2022  HDR BT 4 fractions, 2800 cGy  3/13/23 EUA cervical biopsy, ECC radiation changes, no evidence of malignancy     Malignant neoplasm of cervix (Multi)   4/12/2022 Initial Diagnosis    Malignant neoplasm of cervix (Multi)     33 y.o.  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  Completed " 7/8/2022.  MELISSA 1.5 years.     # Cervical cancer  - No evidence of recurrence by exam or symptoms  - Signs/ sxs of recurrence reviewed  - Surveillance visit in 3 months and will alternate to every 6 months thereafter.        Scribe Attestation  By signing my name below, I, Padma Wade   attest that this documentation has been prepared under the direction and in the presence of Mahi Alba MD.     Provider Attestation - Scribe documentation    All medical record entries made by the Scribe were at my direction and personally dictated by me. I have reviewed the chart and agree that the record accurately reflects my personal performance of the history, physical exam, discussion and plan.    Mahi Alba MD

## 2024-04-18 ENCOUNTER — OFFICE VISIT (OUTPATIENT)
Dept: GYNECOLOGIC ONCOLOGY | Facility: CLINIC | Age: 34
End: 2024-04-18
Payer: COMMERCIAL

## 2024-04-18 VITALS
TEMPERATURE: 97.3 F | BODY MASS INDEX: 26.89 KG/M2 | SYSTOLIC BLOOD PRESSURE: 120 MMHG | DIASTOLIC BLOOD PRESSURE: 76 MMHG | HEART RATE: 60 BPM | WEIGHT: 187.39 LBS | OXYGEN SATURATION: 98 % | RESPIRATION RATE: 18 BRPM

## 2024-04-18 DIAGNOSIS — C53.0 MALIGNANT NEOPLASM OF ENDOCERVIX (MULTI): Primary | ICD-10-CM

## 2024-04-18 DIAGNOSIS — Z85.41 PERSONAL HISTORY OF MALIGNANT NEOPLASM OF CERVIX UTERI: ICD-10-CM

## 2024-04-18 PROCEDURE — 1036F TOBACCO NON-USER: CPT | Performed by: STUDENT IN AN ORGANIZED HEALTH CARE EDUCATION/TRAINING PROGRAM

## 2024-04-18 PROCEDURE — 99214 OFFICE O/P EST MOD 30 MIN: CPT | Performed by: STUDENT IN AN ORGANIZED HEALTH CARE EDUCATION/TRAINING PROGRAM

## 2024-04-18 ASSESSMENT — PAIN SCALES - GENERAL: PAINLEVEL: 0-NO PAIN

## 2024-05-21 ENCOUNTER — LAB (OUTPATIENT)
Dept: LAB | Facility: LAB | Age: 34
End: 2024-05-21
Payer: COMMERCIAL

## 2024-05-21 ENCOUNTER — APPOINTMENT (OUTPATIENT)
Dept: PRIMARY CARE | Facility: CLINIC | Age: 34
End: 2024-05-21
Payer: COMMERCIAL

## 2024-05-21 ENCOUNTER — OFFICE VISIT (OUTPATIENT)
Dept: ENDOCRINOLOGY | Facility: CLINIC | Age: 34
End: 2024-05-21
Payer: COMMERCIAL

## 2024-05-21 VITALS
WEIGHT: 188.4 LBS | RESPIRATION RATE: 16 BRPM | SYSTOLIC BLOOD PRESSURE: 114 MMHG | HEIGHT: 70 IN | HEART RATE: 80 BPM | DIASTOLIC BLOOD PRESSURE: 66 MMHG | BODY MASS INDEX: 26.97 KG/M2

## 2024-05-21 DIAGNOSIS — E05.90 HYPERTHYROIDISM: Primary | ICD-10-CM

## 2024-05-21 DIAGNOSIS — E06.9 THYROIDITIS: Primary | ICD-10-CM

## 2024-05-21 DIAGNOSIS — E05.90 HYPERTHYROIDISM: ICD-10-CM

## 2024-05-21 LAB
T3FREE SERPL-MCNC: 3.1 PG/ML (ref 2.3–4.2)
T4 FREE SERPL-MCNC: 1.05 NG/DL (ref 0.78–1.48)
THYROPEROXIDASE AB SERPL-ACNC: 367 IU/ML
TSH SERPL-ACNC: 3.38 MIU/L (ref 0.44–3.98)

## 2024-05-21 PROCEDURE — 99204 OFFICE O/P NEW MOD 45 MIN: CPT | Performed by: INTERNAL MEDICINE

## 2024-05-21 PROCEDURE — 84445 ASSAY OF TSI GLOBULIN: CPT

## 2024-05-21 PROCEDURE — 84439 ASSAY OF FREE THYROXINE: CPT

## 2024-05-21 PROCEDURE — 84481 FREE ASSAY (FT-3): CPT

## 2024-05-21 PROCEDURE — 84443 ASSAY THYROID STIM HORMONE: CPT

## 2024-05-21 PROCEDURE — 36415 COLL VENOUS BLD VENIPUNCTURE: CPT

## 2024-05-21 PROCEDURE — 1036F TOBACCO NON-USER: CPT | Performed by: INTERNAL MEDICINE

## 2024-05-21 PROCEDURE — 86376 MICROSOMAL ANTIBODY EACH: CPT

## 2024-05-21 ASSESSMENT — ENCOUNTER SYMPTOMS
FEVER: 0
SHORTNESS OF BREATH: 0
PALPITATIONS: 0
DIARRHEA: 0
VOMITING: 0
CHILLS: 0
FATIGUE: 0
HEADACHES: 0
COUGH: 0
NAUSEA: 0

## 2024-05-21 NOTE — LETTER
May 21, 2024     Josh Lucero MD  [address]  [facility] [address]    Patient: Clotilde Connor   YOB: 1990   Date of Visit: 5/21/2024       Dear Dr. Josh Lucero MD:    Thank you for referring Clotilde Connor to me for evaluation. Below are my notes for this consultation.  If you have questions, please do not hesitate to call me. I look forward to following your patient along with you.       Sincerely,     Neelam Gutierrez MD      CC: No Recipients  ______________________________________________________________________________________    Endocrinology New Patient Consult  Subjective  Patient ID: Clotilde Connor is a 33 y.o. female who presents for Thyroid Problem and Hyperthyroidism. Patient was referred by Dr. Lucero.     PCP: No primary care provider on file.    HPI  33-year-old referred by Dr Lucero for evaluation of hyperthyroidism.  She had routine blood work done in January that showed a suppressed TSH at 0.01.  Free T4 was normal.  She denies any previous history of thyroid abnormalities that she knows of.  She does have a family history of thyroid issues in her mother and and maternal grandfather.  She has been feeling okay.  Her weight has not changed recently.  She is currently about 2 years into remission from extensive treatment for cervical cancer.  She is on cyclical estrogen and progesterone which have not been adjusted in several months and she does well with them.  She has been sleeping okay.  She denies any palpitations or sweats.  She does not take biotin.  Review of Systems   Constitutional:  Negative for chills, fatigue and fever.   Respiratory:  Negative for cough and shortness of breath.    Cardiovascular:  Negative for chest pain and palpitations.   Gastrointestinal:  Negative for diarrhea, nausea and vomiting.   Neurological:  Negative for headaches.       Patient Active Problem List   Diagnosis   • Anemia   • Malignant neoplasm of  cervix (Multi)   • Mass of left wrist   • PCB (post coital bleeding)   • Effect, radiation   • PONV (postoperative nausea and vomiting)   • Abnormal uterine and vaginal bleeding, unspecified   • Encounter for screening for human papillomavirus (HPV)   • Localized swelling, mass and lump, right upper limb   • Other contact with and (suspected) exposures hazardous to health   • Personal history of irradiation   • Personal history of malignant neoplasm of cervix uteri   • Personal history of other medical treatment   • 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   • Female infertility   • Malaria       Past Medical History:   Diagnosis Date   • Cervical cancer (Multi) 2022   • PONV (postoperative nausea and vomiting)         Past Surgical History:   Procedure Laterality Date   • CERVICAL BIOPSY     • ECTOPIC PREGNANCY SURGERY     • OVARY SURGERY          Social History     Tobacco Use   Smoking Status Never   Smokeless Tobacco Never      Social History     Substance and Sexual Activity   Alcohol Use Never      Marital status:   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   • EPINEPHrine (EPIPEN) 0.3 mg, intramuscular, As needed, Call 911 after use.   • estradiol (Vivelle-DOT) 0.1 mg/24 hr patch 1 patch, transdermal, 2 times weekly   • fluticasone (Flonase) 50 mcg/actuation nasal spray 2 sprays, Each Nostril, Daily, Shake gently. Before first use, prime pump. After use, clean tip and replace cap.   • miSOPROStoL (CYTOTEC) 400 mcg, vaginal, Once, Insert morning of procedure   • omega-3 acid ethyl esters (LOVAZA) 1 g, oral, Daily   • pentoxifylline  "(TRENTAL) 400 mg, oral, 2 times daily, Do not crush, chew, or split.   • prenatal no115/iron/folic acid (PRENATAL 19 ORAL) oral   • progesterone (PROMETRIUM) 200 mg, oral, Daily, 12 days per month   • vitamin E 400 Units, oral, 2 times daily        Allergies   Allergen Reactions   • Shrimp Hives and Swelling     shrimp        Objective  Vitals:    05/21/24 1403   BP: 114/66   Pulse: 80   Resp: 16      Vitals:    05/21/24 1403   Weight: 85.5 kg (188 lb 6.4 oz)      Body mass index is 27.03 kg/m².   Physical Exam  Constitutional:       Appearance: Normal appearance. She is overweight.   HENT:      Head: Normocephalic and atraumatic.   Neck:      Thyroid: No thyroid mass, thyromegaly or thyroid tenderness.   Cardiovascular:      Rate and Rhythm: Normal rate and regular rhythm.      Heart sounds: No murmur heard.     No gallop.   Pulmonary:      Effort: Pulmonary effort is normal.      Breath sounds: Normal breath sounds.   Abdominal:      Palpations: Abdomen is soft.      Comments: benign   Neurological:      General: No focal deficit present.      Mental Status: She is alert and oriented to person, place, and time.      Deep Tendon Reflexes: Reflexes are normal and symmetric.   Psychiatric:         Behavior: Behavior is cooperative.         Labs:  Lab Results   Component Value Date    TSH 0.01 (L) 01/16/2024    FREET4 1.05 01/16/2024      No results found for: \"PR1\", \"THYROIDPAB\", \"TSI\"     Assessment/Plan  Problem List Items Addressed This Visit    None  Visit Diagnoses       Hyperthyroidism    -  Primary    Relevant Orders    Thyroid Stimulating Hormone    Thyroxine, Free    Triiodothyronine, Free    Thyroid Stimulating Immunoglobulin    Thyroid Peroxidase (TPO) Antibody        33-year-old here for evaluation of hyperthyroidism.  We discussed her course.  We reviewed her thyroid levels.  We discussed the differential diagnosis of hyperthyroidism.  I would recommend starting with a complete thyroid panel plus " antibodies.  We discussed possible need for thyroid imaging based on results.  She is asymptomatic at this point so does not require beta-blockers.  Will make a plan for further evaluation based on today's lab work.  I encouraged her to call or message with any concerns or questions    Electronically signed by:  Neelam Gutierrez MD 05/21/24  2:45 PM

## 2024-05-21 NOTE — PROGRESS NOTES
Endocrinology New Patient Consult  Subjective   Patient ID: Clotilde Connor is a 33 y.o. female who presents for Thyroid Problem and Hyperthyroidism. Patient was referred by Dr. Lucero.     PCP: No primary care provider on file.    HPI  33-year-old referred by Dr Lucero for evaluation of hyperthyroidism.  She had routine blood work done in January that showed a suppressed TSH at 0.01.  Free T4 was normal.  She denies any previous history of thyroid abnormalities that she knows of.  She does have a family history of thyroid issues in her mother and and maternal grandfather.  She has been feeling okay.  Her weight has not changed recently.  She is currently about 2 years into remission from extensive treatment for cervical cancer.  She is on cyclical estrogen and progesterone which have not been adjusted in several months and she does well with them.  She has been sleeping okay.  She denies any palpitations or sweats.  She does not take biotin.  Review of Systems   Constitutional:  Negative for chills, fatigue and fever.   Respiratory:  Negative for cough and shortness of breath.    Cardiovascular:  Negative for chest pain and palpitations.   Gastrointestinal:  Negative for diarrhea, nausea and vomiting.   Neurological:  Negative for headaches.       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    Female infertility    Malaria       Past Medical History:    Diagnosis Date    Cervical cancer (Multi) 2022    PONV (postoperative nausea and vomiting)         Past Surgical History:   Procedure Laterality Date    CERVICAL BIOPSY      ECTOPIC PREGNANCY SURGERY      OVARY SURGERY          Social History     Tobacco Use   Smoking Status Never   Smokeless Tobacco Never      Social History     Substance and Sexual Activity   Alcohol Use Never      Marital status:   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    EPINEPHrine (EPIPEN) 0.3 mg, intramuscular, As needed, Call 911 after use.    estradiol (Vivelle-DOT) 0.1 mg/24 hr patch 1 patch, transdermal, 2 times weekly    fluticasone (Flonase) 50 mcg/actuation nasal spray 2 sprays, Each Nostril, Daily, Shake gently. Before first use, prime pump. After use, clean tip and replace cap.    miSOPROStoL (CYTOTEC) 400 mcg, vaginal, Once, Insert morning of procedure    omega-3 acid ethyl esters (LOVAZA) 1 g, oral, Daily    pentoxifylline (TRENTAL) 400 mg, oral, 2 times daily, Do not crush, chew, or split.    prenatal no115/iron/folic acid (PRENATAL 19 ORAL) oral    progesterone (PROMETRIUM) 200 mg, oral, Daily, 12 days per month    vitamin E 400 Units, oral, 2 times daily        Allergies   Allergen Reactions    Shrimp Hives and Swelling     shrimp        Objective   Vitals:    05/21/24 1403   BP: 114/66   Pulse: 80   Resp: 16      Vitals:    05/21/24 1403   Weight: 85.5 kg (188 lb 6.4 oz)      Body mass index is 27.03 kg/m².   Physical Exam  Constitutional:       Appearance: Normal appearance. She is overweight.   HENT:      Head: Normocephalic and atraumatic.   Neck:  "     Thyroid: No thyroid mass, thyromegaly or thyroid tenderness.   Cardiovascular:      Rate and Rhythm: Normal rate and regular rhythm.      Heart sounds: No murmur heard.     No gallop.   Pulmonary:      Effort: Pulmonary effort is normal.      Breath sounds: Normal breath sounds.   Abdominal:      Palpations: Abdomen is soft.      Comments: benign   Neurological:      General: No focal deficit present.      Mental Status: She is alert and oriented to person, place, and time.      Deep Tendon Reflexes: Reflexes are normal and symmetric.   Psychiatric:         Behavior: Behavior is cooperative.         Labs:  Lab Results   Component Value Date    TSH 0.01 (L) 01/16/2024    FREET4 1.05 01/16/2024      No results found for: \"PR1\", \"THYROIDPAB\", \"TSI\"     Assessment/Plan   Problem List Items Addressed This Visit    None  Visit Diagnoses       Hyperthyroidism    -  Primary    Relevant Orders    Thyroid Stimulating Hormone    Thyroxine, Free    Triiodothyronine, Free    Thyroid Stimulating Immunoglobulin    Thyroid Peroxidase (TPO) Antibody        33-year-old here for evaluation of hyperthyroidism.  We discussed her course.  We reviewed her thyroid levels.  We discussed the differential diagnosis of hyperthyroidism.  I would recommend starting with a complete thyroid panel plus antibodies.  We discussed possible need for thyroid imaging based on results.  She is asymptomatic at this point so does not require beta-blockers.  Will make a plan for further evaluation based on today's lab work.  I encouraged her to call or message with any concerns or questions    Electronically signed by:  Neelam Gutierrez MD 05/21/24  2:45 PM    "

## 2024-05-28 LAB — TSI SER-ACNC: <1 TSI INDEX

## 2024-05-30 ENCOUNTER — OFFICE VISIT (OUTPATIENT)
Dept: PRIMARY CARE | Facility: CLINIC | Age: 34
End: 2024-05-30
Payer: COMMERCIAL

## 2024-05-30 ENCOUNTER — LAB (OUTPATIENT)
Dept: LAB | Facility: LAB | Age: 34
End: 2024-05-30
Payer: COMMERCIAL

## 2024-05-30 VITALS
SYSTOLIC BLOOD PRESSURE: 110 MMHG | WEIGHT: 190 LBS | BODY MASS INDEX: 27.2 KG/M2 | HEIGHT: 70 IN | DIASTOLIC BLOOD PRESSURE: 60 MMHG | HEART RATE: 75 BPM

## 2024-05-30 DIAGNOSIS — Z00.00 ANNUAL PHYSICAL EXAM: Primary | ICD-10-CM

## 2024-05-30 DIAGNOSIS — Z85.41 PERSONAL HISTORY OF MALIGNANT NEOPLASM OF CERVIX UTERI: ICD-10-CM

## 2024-05-30 DIAGNOSIS — Z00.00 ANNUAL PHYSICAL EXAM: ICD-10-CM

## 2024-05-30 LAB
ALBUMIN SERPL BCP-MCNC: 4.6 G/DL (ref 3.4–5)
ALP SERPL-CCNC: 79 U/L (ref 33–110)
ALT SERPL W P-5'-P-CCNC: 15 U/L (ref 7–45)
ANION GAP SERPL CALC-SCNC: 15 MMOL/L (ref 10–20)
AST SERPL W P-5'-P-CCNC: 14 U/L (ref 9–39)
BILIRUB SERPL-MCNC: 0.7 MG/DL (ref 0–1.2)
BUN SERPL-MCNC: 11 MG/DL (ref 6–23)
CALCIUM SERPL-MCNC: 9.7 MG/DL (ref 8.6–10.6)
CHLORIDE SERPL-SCNC: 101 MMOL/L (ref 98–107)
CHOLEST SERPL-MCNC: 155 MG/DL (ref 0–199)
CHOLESTEROL/HDL RATIO: 2.4
CO2 SERPL-SCNC: 27 MMOL/L (ref 21–32)
COTININE UR QL SCN: NEGATIVE
CREAT SERPL-MCNC: 0.73 MG/DL (ref 0.5–1.05)
EGFRCR SERPLBLD CKD-EPI 2021: >90 ML/MIN/1.73M*2
ERYTHROCYTE [DISTWIDTH] IN BLOOD BY AUTOMATED COUNT: 11.6 % (ref 11.5–14.5)
EST. AVERAGE GLUCOSE BLD GHB EST-MCNC: 94 MG/DL
GLUCOSE SERPL-MCNC: 81 MG/DL (ref 74–99)
HBA1C MFR BLD: 4.9 %
HCT VFR BLD AUTO: 38.6 % (ref 36–46)
HDLC SERPL-MCNC: 65.2 MG/DL
HGB BLD-MCNC: 13.4 G/DL (ref 12–16)
LDLC SERPL CALC-MCNC: 76 MG/DL
MCH RBC QN AUTO: 30.2 PG (ref 26–34)
MCHC RBC AUTO-ENTMCNC: 34.7 G/DL (ref 32–36)
MCV RBC AUTO: 87 FL (ref 80–100)
NON HDL CHOLESTEROL: 90 MG/DL (ref 0–149)
NRBC BLD-RTO: 0 /100 WBCS (ref 0–0)
PLATELET # BLD AUTO: 161 X10*3/UL (ref 150–450)
POTASSIUM SERPL-SCNC: 4 MMOL/L (ref 3.5–5.3)
PROT SERPL-MCNC: 6.7 G/DL (ref 6.4–8.2)
RBC # BLD AUTO: 4.44 X10*6/UL (ref 4–5.2)
SODIUM SERPL-SCNC: 139 MMOL/L (ref 136–145)
TRIGL SERPL-MCNC: 68 MG/DL (ref 0–149)
VLDL: 14 MG/DL (ref 0–40)
WBC # BLD AUTO: 3.7 X10*3/UL (ref 4.4–11.3)

## 2024-05-30 PROCEDURE — 85027 COMPLETE CBC AUTOMATED: CPT

## 2024-05-30 PROCEDURE — 80061 LIPID PANEL: CPT

## 2024-05-30 PROCEDURE — 36415 COLL VENOUS BLD VENIPUNCTURE: CPT

## 2024-05-30 PROCEDURE — 83036 HEMOGLOBIN GLYCOSYLATED A1C: CPT

## 2024-05-30 PROCEDURE — 80053 COMPREHEN METABOLIC PANEL: CPT

## 2024-05-30 PROCEDURE — 99395 PREV VISIT EST AGE 18-39: CPT | Performed by: INTERNAL MEDICINE

## 2024-05-30 PROCEDURE — 1036F TOBACCO NON-USER: CPT | Performed by: INTERNAL MEDICINE

## 2024-05-30 ASSESSMENT — ENCOUNTER SYMPTOMS
OCCASIONAL FEELINGS OF UNSTEADINESS: 0
DEPRESSION: 0
LOSS OF SENSATION IN FEET: 0

## 2024-05-30 NOTE — PROGRESS NOTES
"Subjective   Patient ID: Clotilde Connor is a 33 y.o. female who presents for Annual Exam.    HPI   Clotilde is a 33 year-old female who is here to establish primary care and she is due for an annual wellness exam and lab work.  Patient was evaluated by endocrinology in regards to abnormal thyroid function and follow-up lab orders have already been placed by Dr. Barfield patient does see gynecology and is up-to-date on Pap smear which was done on July 11, 2023.  Patient is up-to-date on immunizations.  She has history of cervical cancer which was treated with chemotherapy and radiotherapy and she does follow-up with oncology and gynecology in this regard.  Patient gets regular menses.  She has history of 1 ectopic pregnancy and 1 live birth and her daughter is 10 years old.  Pt denies, fever, chills, CP, SOB, abdominal pain, N/V/D/C or  symptoms. No HA, dizziness, numbness or weakness.  No unintentional loss of weight, loss of appetite, melena, rectal bleeding, mood or sleep issues reported.  No change in vision or hearing reported.  Review of Systems  Grandmother - breast cancer in her 60s  Father - HTN, DM  Thyroid problems run in the family  Objective   /60 (BP Location: Right arm, Patient Position: Sitting, BP Cuff Size: Adult long)   Pulse 75   Ht 1.778 m (5' 10\")   Wt 86.2 kg (190 lb)   BMI 27.26 kg/m²     Physical Exam  General - well developed, well appearing, young female in no acute respiratory distress  Eyes - normal conjunctiva with no pallor or icterus, normal extraocular movements  ENT - normal external auditory canals and tympanic membranes, throat clear with no exudates  Neck - No JVD, thyromegaly or lymphadenopathy  Lungs - no respiratory distress and lungs clear to auscultation bilaterally with no rales or wheezes  Heart - normal S1, S2 with normal heart rate, rhythm and no murmurs   Breasts, pelvic and pap - per gyn  Abdomen -  soft, nontender with no masses or organomegaly  Extremities - " no cyanosis or pedal edema  Neuro - grossly normal neuro exam with no focal neuro deficits  Psych - normal mental status, mood and affect   Skin - no rashes or ulcers  MSK - normal gait with grossly normal ROM of major joints  Assessment/Plan        1.  Annual wellness exam-patient is up-to-date on Pap and pelvic through gynecology, routine labs ordered, she is up-to-date on vaccinations  2.  History of abnormal thyroid function-patient will follow-up with endocrinology as advised  3.  History of cervical cancer-patient will follow-up with gynecology and oncology as advised  Follow-up in 1 year or sooner if needed.  This note was partially generated using the Dragon voice recognition system. There may be some incorrect words, spelling and punctuation errors that were not corrected prior to committing the note to the patient's medical record.

## 2024-07-05 ENCOUNTER — LAB (OUTPATIENT)
Dept: LAB | Facility: LAB | Age: 34
End: 2024-07-05
Payer: COMMERCIAL

## 2024-07-05 DIAGNOSIS — E06.9 THYROIDITIS: ICD-10-CM

## 2024-07-05 LAB
T3FREE SERPL-MCNC: 3.1 PG/ML (ref 2.3–4.2)
T4 FREE SERPL-MCNC: 0.92 NG/DL (ref 0.78–1.48)
TSH SERPL-ACNC: 2.68 MIU/L (ref 0.44–3.98)

## 2024-07-05 PROCEDURE — 84439 ASSAY OF FREE THYROXINE: CPT

## 2024-07-05 PROCEDURE — 84443 ASSAY THYROID STIM HORMONE: CPT

## 2024-07-05 PROCEDURE — 36415 COLL VENOUS BLD VENIPUNCTURE: CPT

## 2024-07-05 PROCEDURE — 84481 FREE ASSAY (FT-3): CPT

## 2024-07-06 DIAGNOSIS — E05.90 HYPERTHYROIDISM: Primary | ICD-10-CM

## 2024-07-17 NOTE — PROGRESS NOTES
Patient ID: Clotilde Connor is a 33 y.o. female.  Referring Physician: No referring provider defined for this encounter.  Primary Care Provider: Marii Martinez MD    Subjective    Interval History:  Notes pelvic pressure onset 2 days after she increased jogging, stating it's similar to previous ectopic pain, she enjoyed a trip to [place] in May, otherwise bowel movements, bladder and appetite are normal, denies abnormal vaginal bleeding or discharge and lower extremity edema.     She denies fever, chills, chest pain, SOB, nausea, vomiting, diarrhea, constipation, dysuria, or any other concerning signs of symptoms.        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   4 cm lesion with full thickness cervical involvement and early parametrial invasion.     PET/CT performed mid-cranium to midthighs on 3/31/2022:  No extracervical abnormal findings.        Underwent chemo-radiation therapy:  Dr. Rushing at the [facility]:  ChemoRT.  EBRT.  4500 cGy 25 fractions.  Dr. Erasmo Howe, Brachytherapy.  4 fractions, 2800 cGy.  Completed 7/8/2022.     No ovarian transposition.    Did have embryos harvested in [place] prior to chemoRT.    Past Medical History:   Diagnosis Date    Cervical cancer (Multi) 2022    PONV (postoperative nausea and vomiting)      Past Surgical History:   Procedure Laterality Date    BRACHIAL ARTERY BYPASS W/ OR W/O VEIN GRAFT      CERVICAL BIOPSY      ECTOPIC PREGNANCY SURGERY      OVARY SURGERY           Objective    BSA: 2.11 meters squared  /80 (BP Location: Left arm, Patient Position: Sitting, BP Cuff Size: Adult)   Pulse 71   Temp 36.8 °C (98.2 °F) (Core)   Resp 18   Wt 89.9 kg (198 lb 3.2 oz)   SpO2 98%   BMI 28.44 kg/m²      Physical Exam  Constitutional:       Appearance: Normal appearance. She is normal weight.   HENT:      Head: Normocephalic.      Mouth/Throat:      Mouth: Mucous membranes are moist.   Eyes:      Pupils: Pupils are equal, round, and  "reactive to light.   Cardiovascular:      Rate and Rhythm: Normal rate and regular rhythm.      Heart sounds: No murmur heard.     No friction rub. No gallop.   Pulmonary:      Effort: Pulmonary effort is normal.      Breath sounds: Normal breath sounds.   Abdominal:      General: Abdomen is flat. Bowel sounds are normal.      Palpations: Abdomen is soft.   Genitourinary:     Comments: Normal external female genitalia without lesions or masses  Speculum exam: Smooth vagina without lesions or masses, cervix with radiation changes, no lesions or masses  Bimanual exam: smooth vagina and cervix without lesions or masses, normal uterus, parametria       Musculoskeletal:         General: No swelling.   Skin:     General: Skin is warm and dry.   Neurological:      Mental Status: She is alert.         Performance Status:  Asymptomatic    Assessment/Plan     Oncology History Overview Note   3/7/2022 Excisional procedure  Final pathology:  2.6 x 2.3 x 2.0 cm specimen submitted.  Invasive carcinoma diagnosed,  most consistent with adenosquamous carcinoma.  Tumor measures 2.6 cm grossly, with \"circumferential involvement of the cone specimen\".  Depth of invasion is at least 8 mm.  Perineural invasion noted as were findings suspicious for LVSI.    ECC:  Non-diagnostic.  3/31/2022 PET/CT No extracervical abnormal findings.      Reported \"indistinct 3.6  x 4.2 cm enhancement and thickening with associated FDG uptake.    ChemoRT.  EBRT.  4500 cGy 25 fractions. in [place].   Completed 6/2022  - 7/8/2022  HDR BT 4 fractions, 2800 cGy  3/13/23 EUA cervical biopsy, ECC radiation changes, no evidence of malignancy     Malignant neoplasm of cervix (Multi)   4/12/2022 Initial Diagnosis    Malignant neoplasm of cervix (Multi)     33 y.o.  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  Completed 7/8/2022.  MELISSA 2 years.     # Cervical cancer  - No " evidence of recurrence by exam or symptoms  - Signs/ sxs of recurrence reviewed  - Surveillance visit in  6 months       Scribe Attestation  By signing my name below, I, Padma Wade   attest that this documentation has been prepared under the direction and in the presence of Mahi Alba MD.     Provider Attestation - Scribe documentation    All medical record entries made by the Scribe were at my direction and personally dictated by me. I have reviewed the chart and agree that the record accurately reflects my personal performance of the history, physical exam, discussion and plan.    Mahi Alba MD

## 2024-07-18 ENCOUNTER — OFFICE VISIT (OUTPATIENT)
Dept: GYNECOLOGIC ONCOLOGY | Facility: CLINIC | Age: 34
End: 2024-07-18
Payer: COMMERCIAL

## 2024-07-18 VITALS
HEART RATE: 71 BPM | WEIGHT: 198.2 LBS | OXYGEN SATURATION: 98 % | SYSTOLIC BLOOD PRESSURE: 126 MMHG | BODY MASS INDEX: 28.44 KG/M2 | TEMPERATURE: 98.2 F | RESPIRATION RATE: 18 BRPM | DIASTOLIC BLOOD PRESSURE: 80 MMHG

## 2024-07-18 DIAGNOSIS — Z08 ENCOUNTER FOR FOLLOW-UP SURVEILLANCE OF CERVICAL CANCER: ICD-10-CM

## 2024-07-18 DIAGNOSIS — E28.39 PRIMARY OVARIAN INSUFFICIENCY: ICD-10-CM

## 2024-07-18 DIAGNOSIS — Z85.41 ENCOUNTER FOR FOLLOW-UP SURVEILLANCE OF CERVICAL CANCER: ICD-10-CM

## 2024-07-18 DIAGNOSIS — C53.8 MALIGNANT NEOPLASM OF OVERLAPPING SITES OF CERVIX (MULTI): Primary | ICD-10-CM

## 2024-07-18 PROCEDURE — 99214 OFFICE O/P EST MOD 30 MIN: CPT | Performed by: STUDENT IN AN ORGANIZED HEALTH CARE EDUCATION/TRAINING PROGRAM

## 2024-07-18 RX ORDER — PROGESTERONE 200 MG/1
200 CAPSULE ORAL DAILY
Qty: 30 CAPSULE | Refills: 11 | Status: SHIPPED | OUTPATIENT
Start: 2024-07-18 | End: 2025-07-18

## 2024-07-18 RX ORDER — ESTRADIOL 0.1 MG/D
1 FILM, EXTENDED RELEASE TRANSDERMAL 2 TIMES WEEKLY
Qty: 8 PATCH | Refills: 11 | Status: SHIPPED | OUTPATIENT
Start: 2024-07-18 | End: 2025-07-18

## 2024-07-18 ASSESSMENT — PAIN SCALES - GENERAL: PAINLEVEL: 0-NO PAIN

## 2024-08-07 ENCOUNTER — TELEPHONE (OUTPATIENT)
Dept: GYNECOLOGIC ONCOLOGY | Facility: HOSPITAL | Age: 34
End: 2024-08-07
Payer: COMMERCIAL

## 2024-08-07 DIAGNOSIS — N64.52 NIPPLE DISCHARGE: ICD-10-CM

## 2024-08-07 NOTE — TELEPHONE ENCOUNTER
Patient sent [employer] message reporting bilateral nipple drainage, yellow to brown in color.  Drainage increases when pressure applied to breasts.    Denies bilateral breast lumps.   + breast tenderness.     Message  routed to Dr. Alba.    Return [employer] message sent to patient to notify that Dr. Alba recommends bilateral diagnostic mammogram/ultrasound and that orders have been entered and she can call  to schedule breast imaging.

## 2024-08-12 ENCOUNTER — LAB (OUTPATIENT)
Dept: LAB | Facility: LAB | Age: 34
End: 2024-08-12
Payer: COMMERCIAL

## 2024-08-12 DIAGNOSIS — E05.90 HYPERTHYROIDISM: ICD-10-CM

## 2024-08-12 LAB
T3FREE SERPL-MCNC: 3 PG/ML (ref 2.3–4.2)
T4 FREE SERPL-MCNC: 0.94 NG/DL (ref 0.78–1.48)
TSH SERPL-ACNC: 9.45 MIU/L (ref 0.44–3.98)

## 2024-08-12 PROCEDURE — 84439 ASSAY OF FREE THYROXINE: CPT

## 2024-08-12 PROCEDURE — 84481 FREE ASSAY (FT-3): CPT

## 2024-08-12 PROCEDURE — 84443 ASSAY THYROID STIM HORMONE: CPT

## 2024-08-12 PROCEDURE — 36415 COLL VENOUS BLD VENIPUNCTURE: CPT

## 2024-08-14 ENCOUNTER — OFFICE VISIT (OUTPATIENT)
Dept: SURGICAL ONCOLOGY | Facility: CLINIC | Age: 34
End: 2024-08-14
Payer: COMMERCIAL

## 2024-08-14 ENCOUNTER — HOSPITAL ENCOUNTER (OUTPATIENT)
Dept: RADIOLOGY | Facility: CLINIC | Age: 34
Discharge: HOME | End: 2024-08-14
Payer: COMMERCIAL

## 2024-08-14 VITALS — WEIGHT: 198.19 LBS | BODY MASS INDEX: 28.37 KG/M2 | HEIGHT: 70 IN

## 2024-08-14 VITALS
DIASTOLIC BLOOD PRESSURE: 80 MMHG | SYSTOLIC BLOOD PRESSURE: 127 MMHG | WEIGHT: 204.48 LBS | BODY MASS INDEX: 29.34 KG/M2 | HEART RATE: 63 BPM

## 2024-08-14 DIAGNOSIS — R92.30 DENSE BREAST TISSUE: ICD-10-CM

## 2024-08-14 DIAGNOSIS — N64.52 NIPPLE DISCHARGE: ICD-10-CM

## 2024-08-14 DIAGNOSIS — N64.52 NIPPLE DISCHARGE: Primary | ICD-10-CM

## 2024-08-14 PROCEDURE — 77062 BREAST TOMOSYNTHESIS BI: CPT

## 2024-08-14 PROCEDURE — 99213 OFFICE O/P EST LOW 20 MIN: CPT | Performed by: NURSE PRACTITIONER

## 2024-08-14 PROCEDURE — 1036F TOBACCO NON-USER: CPT | Performed by: NURSE PRACTITIONER

## 2024-08-14 PROCEDURE — 77062 BREAST TOMOSYNTHESIS BI: CPT | Performed by: RADIOLOGY

## 2024-08-14 PROCEDURE — 76981 USE PARENCHYMA: CPT | Mod: 50,RT

## 2024-08-14 PROCEDURE — 77066 DX MAMMO INCL CAD BI: CPT | Performed by: RADIOLOGY

## 2024-08-14 PROCEDURE — 99203 OFFICE O/P NEW LOW 30 MIN: CPT | Performed by: NURSE PRACTITIONER

## 2024-08-14 PROCEDURE — 76642 ULTRASOUND BREAST LIMITED: CPT | Mod: 50

## 2024-08-14 PROCEDURE — 76642 ULTRASOUND BREAST LIMITED: CPT | Performed by: RADIOLOGY

## 2024-08-14 ASSESSMENT — PAIN SCALES - GENERAL: PAINLEVEL: 0-NO PAIN

## 2024-08-14 NOTE — PROGRESS NOTES
[facility]  Clotilde Connor female   1990 33 y.o.   [id number]      Chief Complaint  New patient, bilateral nipple discharge.     History Of Present Illness  Clotilde Connor is a pleasant 33 y.o. female presenting to the breast center with bilateral spontaneous brown nipple discharge for the past 2 weeks. She denies fever, chills, or breast pain. She denies breast surgery or biopsy. She has family history of breast cancer in her paternal grandmother. She has a personal history of Stage IIIB Adenosquamous Carcinoma and completed adjuvant chemotherapy, pelvic radiation therapy, and brachytherapy. She is on HRT for the past 2 years.    BREAST IMAGIN2024 Bilateral diagnostic mammogram with bilateral ultrasound, indicates BI-RADS Category 1.     REPRODUCTIVE HISTORY: menarche age 9, , first birth age 23,  x 11 months, OCP's x 5-10 years, treatment induced menopause age 32 s/p pelvic radiation, on HRT x 2 years and current, heterogeneously dense                              FAMILY CANCER HISTORY:   Paternal Grandmother: Breast cancer 60's  Maternal Grandfather: Prostate cancer, 50's     Review of Systems  Constitutional:  Negative for appetite change, fatigue, fever and unexpected weight change.   HENT:  Negative for ear pain, hearing loss, nosebleeds, sore throat and trouble swallowing.    Eyes:  Negative for discharge, itching and visual disturbance.   Breast: As stated in HPI.  Respiratory:  Negative for cough, chest tightness and shortness of breath.    Cardiovascular:  Negative for chest pain, palpitations and leg swelling.   Gastrointestinal:  Negative for abdominal pain, constipation, diarrhea and nausea.   Endocrine: Negative for cold intolerance and heat intolerance.   Genitourinary:  Negative for dysuria, frequency, hematuria, pelvic pain and vaginal bleeding.   Musculoskeletal:  Negative for arthralgias, back pain, gait problem, joint swelling and myalgias.    Skin:  Negative for color change and rash.   Allergic/Immunologic: Negative for environmental allergies and food allergies.   Neurological:  Negative for tremors, speech difficulty, weakness, numbness and headaches. Positive for dizziness.  Hematological:  Does not bruise/bleed easily.   Psychiatric/Behavioral:  Negative for agitation, dysphoric mood and sleep disturbance. The patient is not nervous/anxious.       Past Medical History  She has a past medical history of Cervical cancer (Multi) (2022) and PONV (postoperative nausea and vomiting).    Surgical History  She has a past surgical history that includes Ectopic pregnancy surgery; Ovary surgery; Cervical biopsy; and Brachial artery bypass w/ or w/o vein graft.    Family History  Cancer-related family history includes Breast cancer in her paternal grandmother.     Social History  She reports that she has never smoked. She has never used smokeless tobacco. She reports that she does not drink alcohol and does not use drugs.    Allergies  Shrimp    Medications  Current Outpatient Medications   Medication Instructions    estradiol (Vivelle-DOT) 0.1 mg/24 hr patch 1 patch, transdermal, 2 times weekly    fluticasone (Flonase) 50 mcg/actuation nasal spray 2 sprays, Each Nostril, Daily, Shake gently. Before first use, prime pump. After use, clean tip and replace cap.    omega-3 acid ethyl esters (LOVAZA) 1 g, oral, Daily    progesterone (PROMETRIUM) 200 mg, oral, Daily, 12 days per month    vitamin E 400 Units, oral, 2 times daily       Last Recorded Vitals  Vitals:    08/14/24 1140   BP: 127/80   Pulse: 63        Physical Exam  Patient is alert and oriented x3 and in a relaxed and appropriate mood. Her gait is steady. Sclera is clear. The breasts are nearly symmetrical. The tissue is soft without palpable abnormalities, discrete nodules or masses. The skin and nipples appear normal. There is no cervical, supraclavicular or axillary lymphadenopathy. Heart rate and  rhythm normal, S1 and S2 appreciated. The lungs are clear to auscultation bilaterally.       Relevant Results and Imaging  Study Result    Narrative & Impression   Interpreted By:  Cristy Gonzales,   STUDY:  BI MAMMO BILATERAL DIAGNOSTIC TOMOSYNTHESIS; BI US BREAST LIMITED  BILATERAL;  8/14/2024 9:34 am; 8/14/2024 10:07 am      ACCESSION NUMBER(S):  [id number]; [id number]      ORDERING CLINICIAN:  COSTA PYLE      INDICATION:  33-year-old with 2 week history of bilateral spontaneous brown nipple  discharge. Baseline exam. Paternal grandmother with breast cancer  history.      COMPARISON:  None.      FINDINGS:  MAMMOGRAPHY: 2D and tomosynthesis images were reviewed at 1 mm slice  thickness.      Density:  The breast tissue is heterogeneously dense, which may  obscure small masses.      With attention to the subareolar regions, there is no mammographic  abnormality. No suspicious masses or calcifications are identified  within either breast.      ULTRASOUND: Targeted ultrasound was performed of the subareolar  aspects of each breast by a registered sonographer with elastography.      Normal fibroglandular tissues visualized in the subareolar regions.  No focal mass or shadowing. The tissue is soft on elastography. No  ductal dilatation.      IMPRESSION:  No mammographic or targeted sonographic evidence of malignancy.  Independent clinical evaluation of the nipple discharge recommended.      No mammographic evidence of malignancy on this baseline exam.      BI-RADS CATEGORY:  BI-RADS Category:  1 Negative.  Recommendation:  Clinical follow-up.  Recommended Date:  Immediate.  Laterality:  Bilateral.      For any future breast imaging appointments, please call [phone]).          MACRO:  None      Signed by: Cristy Gonzales 8/14/2024 10:24 AM       I explained the results in depth, along with suggested explanation for follow up recommendations based on the testing results. BI-RADS Category  1      Orders  Orders Placed This Encounter   Procedures    MR breast bilateral w contrast full protocol     Standing Status:   Future     Standing Expiration Date:   10/14/2025     Order Specific Question:   Has the Patient had a prior MRI with contrast and had an allergic reaction?     Answer:   No     Order Specific Question:   Reason for exam:     Answer:   spontaneous bilateral brown nipple discharge, dense breast tissue     Order Specific Question:   Is the patient pregnant or breast feeding?     Answer:   No     Order Specific Question:   What is the patient's sedation requirement?     Answer:   No Sedation     Order Specific Question:   Does the patient have a Cochlear Implant, Pacemaker, Defibrillator, Pacing Wire, Brain Aneurysm Clip, Implanted Nerve or Bone Graft Stimulator, Implanted Breast Tissue Expander, Glucose Monitor or Neulasta Device?     Answer:   No     Order Specific Question:   Radiologist to Determine Optimal Study     Answer:   Yes     Order Specific Question:   Release result to [employer]     Answer:   Immediate     Order Specific Question:   Is this exam part of a Research Study? If Yes, link this order to the research study     Answer:   No       Visit Diagnosis  1. Nipple discharge  MR breast bilateral w contrast full protocol      2. Dense breast tissue  MR breast bilateral w contrast full protocol          Assessment/Plan  Normal clinical exam and imaging, bilateral spontaneous nipple discharge, no breast surgery or biopsy, family history of breast cancer, heterogeneously dense, history cervical cancer, s/p chemo/radiation/brachytherapy       Plan/Patient Discussion/Summary  Full breast MRI now and will call with results.    You can see your health information, review clinical summaries from office visits & test results online when you follow your health with MY  Chart, a personal health record. To sign up go to [email or web address]. If you need assistance with signing up or  trouble getting into your account call [email or web address] Patient Line 24/7 at [phone].    My office phone number is [phone] if you need to get in touch with me or have additional questions or concerns. Thank you for choosing [facility] and trusting me as your healthcare provider. I look forward to seeing you again at your next office visit. I am honored to be a provider on your health care team and I remain dedicated to helping you achieve your health goals.    Zofia Irwin, APRN-CNP

## 2024-08-16 DIAGNOSIS — Z85.41 PERSONAL HISTORY OF MALIGNANT NEOPLASM OF CERVIX UTERI: Primary | ICD-10-CM

## 2024-08-27 ENCOUNTER — APPOINTMENT (OUTPATIENT)
Dept: ENDOCRINOLOGY | Facility: CLINIC | Age: 34
End: 2024-08-27
Payer: COMMERCIAL

## 2024-08-27 VITALS
HEIGHT: 70 IN | BODY MASS INDEX: 29.23 KG/M2 | SYSTOLIC BLOOD PRESSURE: 120 MMHG | WEIGHT: 204.2 LBS | RESPIRATION RATE: 16 BRPM | DIASTOLIC BLOOD PRESSURE: 70 MMHG | HEART RATE: 71 BPM

## 2024-08-27 DIAGNOSIS — E06.9 THYROIDITIS: Primary | ICD-10-CM

## 2024-08-27 PROCEDURE — 1036F TOBACCO NON-USER: CPT | Performed by: INTERNAL MEDICINE

## 2024-08-27 PROCEDURE — 3008F BODY MASS INDEX DOCD: CPT | Performed by: INTERNAL MEDICINE

## 2024-08-27 PROCEDURE — 99213 OFFICE O/P EST LOW 20 MIN: CPT | Performed by: INTERNAL MEDICINE

## 2024-08-27 ASSESSMENT — ENCOUNTER SYMPTOMS
HEADACHES: 0
FATIGUE: 0
FEVER: 0
COUGH: 0
PALPITATIONS: 0
NAUSEA: 0
DIARRHEA: 0
SHORTNESS OF BREATH: 0
VOMITING: 0
CHILLS: 0

## 2024-08-27 NOTE — PROGRESS NOTES
Endocrinology: Follow up visit  Subjective   Patient ID: Clotilde Connor is a 34 y.o. female who presents for Hyperthyroidism.    PCP: Marii Martinez MD    HPI  Here for follow up  hyperthyroidism.  Since last visit determined hyperthyroidism due to thyroiditis thyroid levels normal until this month.  Tsh from 2 to 9 in one month.  She has not noticed any drastic changes.  Tired occ and weight is up a bit but busy.       Review of Systems   Constitutional:  Negative for chills, fatigue and fever.   Respiratory:  Negative for cough and shortness of breath.    Cardiovascular:  Negative for chest pain and palpitations.   Gastrointestinal:  Negative for diarrhea, nausea and vomiting.   Neurological:  Negative for headaches.       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 Before first use, prime pump. After use, clean tip and replace cap.    omega-3 acid ethyl esters (LOVAZA) 1 g, oral, Daily    progesterone (PROMETRIUM) 200 mg, oral, Daily, 12 days per month     vitamin E 400 Units, oral, 2 times daily        Allergies   Allergen Reactions    Shrimp Hives and Swelling     shrimp        Objective   Vitals:    08/27/24 1127   BP: 120/70   Pulse: 71   Resp: 16      Vitals:    08/27/24 1127   Weight: 92.6 kg (204 lb 3.2 oz)      Body mass index is 29.3 kg/m².   Physical Exam  Constitutional:       Appearance: Normal appearance. She is overweight.   HENT:      Head: Normocephalic and atraumatic.   Neck:      Thyroid: No thyroid mass, thyromegaly or thyroid tenderness.   Cardiovascular:      Rate and Rhythm: Normal rate and regular rhythm.      Heart sounds: No murmur heard.     No gallop.   Pulmonary:      Effort: Pulmonary effort is normal.      Breath sounds: Normal breath sounds.   Abdominal:      Palpations: Abdomen is soft.      Comments: benign   Neurological:      General: No focal deficit present.      Mental Status: She is alert and oriented to person, place, and time.      Deep Tendon Reflexes: Reflexes are normal and symmetric.   Psychiatric:         Behavior: Behavior is cooperative.         Labs:  Lab Results   Component Value Date    HGBA1C 4.9 05/30/2024    TSH 9.45 (H) 08/12/2024    FREET4 0.94 08/12/2024      Lab Results   Component Value Date    THYROIDPAB 367 (H) 05/21/2024    TSI <1.0 05/21/2024        Assessment/Plan   Assessment & Plan  Thyroiditis    Discussed course.  Discussed thyroiditis and risk of hypo.  Given tsh was normal at 2 a month ago ok to wait and recheck in a month  Discussed t4 dosing and proper timing in case needs to start medication  Follow up in 6 months      Electronically signed by:  Neelam Gutierrez MD 08/27/24 11:59 AM

## 2024-09-03 ENCOUNTER — HOSPITAL ENCOUNTER (OUTPATIENT)
Dept: RADIOLOGY | Facility: HOSPITAL | Age: 34
End: 2024-09-03
Payer: COMMERCIAL

## 2024-09-11 ENCOUNTER — LAB (OUTPATIENT)
Dept: LAB | Facility: LAB | Age: 34
End: 2024-09-11
Payer: COMMERCIAL

## 2024-09-11 DIAGNOSIS — E06.9 THYROIDITIS: ICD-10-CM

## 2024-09-11 LAB
T4 FREE SERPL-MCNC: 0.84 NG/DL (ref 0.78–1.48)
TSH SERPL-ACNC: 12.04 MIU/L (ref 0.44–3.98)

## 2024-09-11 PROCEDURE — 84439 ASSAY OF FREE THYROXINE: CPT

## 2024-09-11 PROCEDURE — 84443 ASSAY THYROID STIM HORMONE: CPT

## 2024-09-11 PROCEDURE — 36415 COLL VENOUS BLD VENIPUNCTURE: CPT

## 2024-09-12 ENCOUNTER — APPOINTMENT (OUTPATIENT)
Dept: PRIMARY CARE | Facility: CLINIC | Age: 34
End: 2024-09-12
Payer: COMMERCIAL

## 2024-09-12 DIAGNOSIS — E03.9 HYPOTHYROIDISM, UNSPECIFIED TYPE: Primary | ICD-10-CM

## 2024-09-12 DIAGNOSIS — Z23 NEED FOR HEPATITIS A VACCINATION: ICD-10-CM

## 2024-09-12 PROCEDURE — 90471 IMMUNIZATION ADMIN: CPT | Performed by: INTERNAL MEDICINE

## 2024-09-12 PROCEDURE — 90632 HEPA VACCINE ADULT IM: CPT | Performed by: INTERNAL MEDICINE

## 2024-09-12 RX ORDER — LEVOTHYROXINE SODIUM 75 UG/1
75 TABLET ORAL DAILY
Qty: 90 TABLET | Refills: 1 | Status: SHIPPED | OUTPATIENT
Start: 2024-09-12 | End: 2025-09-12

## 2024-09-12 NOTE — PROGRESS NOTES
"Goal Outcome Evaluation:      Plan of Care Reviewed With: patient    Overall Patient Progress: improvingOverall Patient Progress: improving    1930-0730    BP (!) 145/76 (BP Location: Left arm)   Pulse 110   Temp 98.6  F (37  C) (Oral)   Resp 18   Ht 1.575 m (5' 2.01\")   SpO2 98%   BMI 19.02 kg/m        Activity: up to bathroom with assist of 1 and walker   Neuros: alert and orientated x 4, calm and cooperative, bilateral upper extremities  tremors continue, but improved per patient   Cardiac: tachy (110), other wise WNL   Respiratory: O2 sats high 90's on RA, unlabored     GI/: abdomen soft/non-tender.  Last BM 1/15, voiding spont with low PVR's   Diet: regular as tolerated   Lines: PAC   Incisions/Drains: n/a    Labs: reviewed   Pain/nausea: denied need for pain meds, no nausea    New changes this shift: none    Plan: continue POC.  Possible TCU placement today               " Patient came in today for #2 HEP A VACCINE  _. Injection was given in the LEFT _deltoid. Injection tolerated well.

## 2024-10-01 ENCOUNTER — HOSPITAL ENCOUNTER (OUTPATIENT)
Dept: RADIOLOGY | Facility: HOSPITAL | Age: 34
Discharge: HOME | End: 2024-10-01
Payer: COMMERCIAL

## 2024-10-01 DIAGNOSIS — R92.30 DENSE BREAST TISSUE: ICD-10-CM

## 2024-10-01 DIAGNOSIS — N64.52 NIPPLE DISCHARGE: ICD-10-CM

## 2024-10-01 PROCEDURE — 2550000001 HC RX 255 CONTRASTS: Performed by: NURSE PRACTITIONER

## 2024-10-01 PROCEDURE — 77049 MRI BREAST C-+ W/CAD BI: CPT

## 2024-10-01 PROCEDURE — 77049 MRI BREAST C-+ W/CAD BI: CPT | Performed by: STUDENT IN AN ORGANIZED HEALTH CARE EDUCATION/TRAINING PROGRAM

## 2024-10-01 PROCEDURE — A9575 INJ GADOTERATE MEGLUMI 0.1ML: HCPCS | Performed by: NURSE PRACTITIONER

## 2024-10-01 RX ORDER — GADOTERATE MEGLUMINE 376.9 MG/ML
0.2 INJECTION INTRAVENOUS
Status: COMPLETED | OUTPATIENT
Start: 2024-10-01 | End: 2024-10-01

## 2024-10-23 ENCOUNTER — LAB (OUTPATIENT)
Dept: LAB | Facility: LAB | Age: 34
End: 2024-10-23
Payer: COMMERCIAL

## 2024-10-23 DIAGNOSIS — E03.9 HYPOTHYROIDISM, UNSPECIFIED TYPE: ICD-10-CM

## 2024-10-23 DIAGNOSIS — E06.9 THYROIDITIS: Primary | ICD-10-CM

## 2024-10-23 LAB — TSH SERPL-ACNC: 2.13 MIU/L (ref 0.44–3.98)

## 2024-10-23 PROCEDURE — 84443 ASSAY THYROID STIM HORMONE: CPT

## 2024-10-23 PROCEDURE — 36415 COLL VENOUS BLD VENIPUNCTURE: CPT

## 2024-11-11 ENCOUNTER — APPOINTMENT (OUTPATIENT)
Dept: RADIOLOGY | Facility: HOSPITAL | Age: 34
End: 2024-11-11
Payer: COMMERCIAL

## 2024-11-20 ENCOUNTER — HOSPITAL ENCOUNTER (OUTPATIENT)
Dept: RADIOLOGY | Facility: HOSPITAL | Age: 34
Discharge: HOME | End: 2024-11-20
Payer: COMMERCIAL

## 2024-11-20 DIAGNOSIS — Z85.41 PERSONAL HISTORY OF MALIGNANT NEOPLASM OF CERVIX UTERI: ICD-10-CM

## 2024-11-20 PROCEDURE — 72197 MRI PELVIS W/O & W/DYE: CPT | Performed by: RADIOLOGY

## 2024-11-20 PROCEDURE — 72197 MRI PELVIS W/O & W/DYE: CPT

## 2024-11-20 PROCEDURE — A9575 INJ GADOTERATE MEGLUMI 0.1ML: HCPCS | Performed by: STUDENT IN AN ORGANIZED HEALTH CARE EDUCATION/TRAINING PROGRAM

## 2024-11-20 PROCEDURE — 2550000001 HC RX 255 CONTRASTS: Performed by: STUDENT IN AN ORGANIZED HEALTH CARE EDUCATION/TRAINING PROGRAM

## 2024-11-20 RX ORDER — GADOTERATE MEGLUMINE 376.9 MG/ML
0.2 INJECTION INTRAVENOUS
Status: COMPLETED | OUTPATIENT
Start: 2024-11-20 | End: 2024-11-20

## 2024-11-20 ASSESSMENT — ENCOUNTER SYMPTOMS
DEPRESSION: 0
LOSS OF SENSATION IN FEET: 0
OCCASIONAL FEELINGS OF UNSTEADINESS: 0

## 2024-12-20 DIAGNOSIS — E03.9 HYPOTHYROIDISM, UNSPECIFIED TYPE: ICD-10-CM

## 2024-12-20 RX ORDER — LEVOTHYROXINE SODIUM 75 UG/1
75 TABLET ORAL DAILY
Qty: 90 TABLET | Refills: 0 | Status: SHIPPED | OUTPATIENT
Start: 2024-12-20 | End: 2024-12-22 | Stop reason: SDUPTHER

## 2024-12-22 DIAGNOSIS — E03.9 HYPOTHYROIDISM, UNSPECIFIED TYPE: ICD-10-CM

## 2024-12-22 RX ORDER — LEVOTHYROXINE SODIUM 75 UG/1
75 TABLET ORAL DAILY
Qty: 90 TABLET | Refills: 0 | Status: SHIPPED | OUTPATIENT
Start: 2024-12-22 | End: 2025-12-22

## 2025-01-21 ENCOUNTER — TELEMEDICINE (OUTPATIENT)
Dept: PRIMARY CARE | Facility: CLINIC | Age: 35
End: 2025-01-21
Payer: COMMERCIAL

## 2025-01-21 DIAGNOSIS — J01.80 ACUTE NON-RECURRENT SINUSITIS OF OTHER SINUS: Primary | ICD-10-CM

## 2025-01-21 PROCEDURE — 99213 OFFICE O/P EST LOW 20 MIN: CPT | Performed by: INTERNAL MEDICINE

## 2025-01-21 PROCEDURE — 1036F TOBACCO NON-USER: CPT | Performed by: INTERNAL MEDICINE

## 2025-01-21 RX ORDER — AMOXICILLIN AND CLAVULANATE POTASSIUM 875; 125 MG/1; MG/1
875 TABLET, FILM COATED ORAL 2 TIMES DAILY
Qty: 20 TABLET | Refills: 0 | Status: SHIPPED | OUTPATIENT
Start: 2025-01-21 | End: 2025-01-31

## 2025-01-21 NOTE — PROGRESS NOTES
Subjective   Patient ID: Clotilde Connor is a 34 y.o. female who presents for Sinusitis.    HPI   Clotilde is a 34-year-old female called via a regular video call for a virtual/sick visit.  She had cold symptoms about 2 weeks ago but for the past 6 days patient has been experiencing persistent nasal congestion with postnasal drainage and cough productive of yellowish-green phlegm and pressure/pain in forehead and cheeks.  No history of chest pain, wheezing, GI or  symptoms.  Patient has not had a COVID test done.  Review of Systems  As per HPI.  Objective   There were no vitals taken for this visit.    Physical Exam  Patient sounds congested but she is able to speak in full sentences, awake and alert in no acute respiratory distress  Assessment/Plan     Acute sinusitis-Augmentin 875 mg twice daily for 10 days will be prescribed and patient has been advised to contact me if symptoms do not improve with antibiotic therapy  Follow-up as advised during previous visit.  This note was partially generated using the Dragon voice recognition system. There may be some incorrect words, spelling and punctuation errors that were not corrected prior to committing the note to the patient's medical record.

## 2025-01-22 NOTE — PROGRESS NOTES
Patient ID: Clotilde Connor is a 34 y.o. female.  Referring Physician: No referring provider defined for this encounter.  Primary Care Provider: Marii Martinez MD    Subjective    Interval History:  Notes she is managing a sinus infection with antibiotics and is experiencing mild spotting after intercourse, denies dyspareunia. Otherwise notes weight gain attributing to thyroid Rx.    She denies fever, chills, chest pain, SOB, nausea, vomiting, diarrhea, constipation, dysuria, or any other concerning signs of symptoms.        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     PET/CT performed mid-cranium to midthighs on 3/31/2022:  No extracervical abnormal findings.        Underwent chemo-radiation therapy:  Dr[facility] Rushing at [facility]:  ChemoRT.  EBRT.  4500 cGy 25 fractions.  Dr. Erasmo Howe, Brachytherapy.  4 fractions, 2800 cGy.  Completed 7/8/2022.     No ovarian transposition.    Did have embryos harvested in [place] prior to chemoRT.    Past Medical History:   Diagnosis Date    Cervical cancer 2022    Hx antineoplastic chemo     PONV (postoperative nausea and vomiting)      Past Surgical History:   Procedure Laterality Date    BRACHIAL ARTERY BYPASS W/ OR W/O VEIN GRAFT      CERVICAL BIOPSY      ECTOPIC PREGNANCY SURGERY      OVARY SURGERY           Objective    BSA: 2.22 meters squared  /84 (BP Location: Left arm, Patient Position: Sitting, BP Cuff Size: Adult)   Pulse 73   Temp 36.1 °C (97 °F) (Core)   Resp 18   Wt 99.8 kg (220 lb)   LMP 01/15/2025   SpO2 97%   BMI 31.85 kg/m²      Physical Exam  Constitutional:       Appearance: Normal appearance. She is normal weight.   HENT:      Head: Normocephalic.      Mouth/Throat:      Mouth: Mucous membranes are moist.   Eyes:      Pupils: Pupils are equal, round, and reactive to light.   Cardiovascular:      Rate and Rhythm: Normal rate and  "regular rhythm.      Heart sounds: No murmur heard.     No friction rub. No gallop.   Pulmonary:      Effort: Pulmonary effort is normal.      Breath sounds: Normal breath sounds.   Abdominal:      General: Abdomen is flat. Bowel sounds are normal.      Palpations: Abdomen is soft.   Genitourinary:     Comments: Normal external female genitalia without lesions or masses  Speculum exam: Smooth vagina without lesions or masses, cervix with radiation changes, no lesions or masses  Bimanual exam: smooth vagina and cervix without lesions or masses, normal uterus, parametria       Musculoskeletal:         General: No swelling.   Skin:     General: Skin is warm and dry.   Neurological:      Mental Status: She is alert.         Performance Status:  Asymptomatic    Assessment/Plan     Oncology History Overview Note   3/7/2022 Excisional procedure  Final pathology:  2.6 x 2.3 x 2.0 cm specimen submitted.  Invasive carcinoma diagnosed,  most consistent with adenosquamous carcinoma.  Tumor measures 2.6 cm grossly, with \"circumferential involvement of the cone specimen\".  Depth of invasion is at least 8 mm.  Perineural invasion noted as were findings suspicious for LVSI.    ECC:  Non-diagnostic.  3/31/2022 PET/CT No extracervical abnormal findings.      Reported \"indistinct 3.6  x 4.2 cm enhancement and thickening with associated FDG uptake.    ChemoRT.  EBRT.  4500 cGy 25 fractions. in [place].   Completed 6/2022  - 7/8/2022  HDR BT 4 fractions, 2800 cGy  3/13/23 EUA cervical biopsy, ECC radiation changes, no evidence of malignancy     Malignant neoplasm of cervix (Multi)   4/12/2022 Initial Diagnosis    Malignant neoplasm of cervix (Multi)     34 y.o.  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  Completed 7/8/2022.  MELISSA 2 years.     # Cervical cancer  - No evidence of recurrence by exam or symptoms  - Signs/ sxs of recurrence " reviewed  - Pap collected today given spotting with intercourse, suspect radiation changes   - Surveillance visit in 6 months       Scribe Attestation  By signing my name below, I, Padma Wade   attest that this documentation has been prepared under the direction and in the presence of Mahi Alba MD.     Provider Attestation - Scribe documentation    All medical record entries made by the Scribe were at my direction and personally dictated by me. I have reviewed the chart and agree that the record accurately reflects my personal performance of the history, physical exam, discussion and plan.    Mahi Alba MD

## 2025-01-23 ENCOUNTER — OFFICE VISIT (OUTPATIENT)
Dept: GYNECOLOGIC ONCOLOGY | Facility: CLINIC | Age: 35
End: 2025-01-23
Payer: COMMERCIAL

## 2025-01-23 VITALS
BODY MASS INDEX: 31.85 KG/M2 | WEIGHT: 220 LBS | OXYGEN SATURATION: 97 % | RESPIRATION RATE: 18 BRPM | TEMPERATURE: 97 F | HEART RATE: 73 BPM | DIASTOLIC BLOOD PRESSURE: 84 MMHG | SYSTOLIC BLOOD PRESSURE: 124 MMHG

## 2025-01-23 DIAGNOSIS — Z85.41 ENCOUNTER FOR FOLLOW-UP SURVEILLANCE OF CERVICAL CANCER: ICD-10-CM

## 2025-01-23 DIAGNOSIS — Z08 ENCOUNTER FOR FOLLOW-UP SURVEILLANCE OF CERVICAL CANCER: ICD-10-CM

## 2025-01-23 DIAGNOSIS — C53.8 MALIGNANT NEOPLASM OF OVERLAPPING SITES OF CERVIX (MULTI): Primary | ICD-10-CM

## 2025-01-23 PROCEDURE — 1036F TOBACCO NON-USER: CPT | Performed by: STUDENT IN AN ORGANIZED HEALTH CARE EDUCATION/TRAINING PROGRAM

## 2025-01-23 PROCEDURE — 99214 OFFICE O/P EST MOD 30 MIN: CPT | Performed by: STUDENT IN AN ORGANIZED HEALTH CARE EDUCATION/TRAINING PROGRAM

## 2025-01-23 ASSESSMENT — PAIN SCALES - GENERAL: PAINLEVEL_OUTOF10: 0-NO PAIN

## 2025-01-28 LAB
CYTOLOGY CMNT CVX/VAG CYTO-IMP: NORMAL
LAB AP HISTORY OF MALIGNANCY: NORMAL
LAB AP HPV HR: NORMAL
LAB AP TREATMENT HISTORY: NORMAL
LABORATORY COMMENT REPORT: NORMAL
LMP START DATE: NORMAL
PATH REPORT.RELEVANT HX SPEC: NORMAL
PATH REPORT.TOTAL CANCER: NORMAL

## 2025-01-29 ENCOUNTER — TELEPHONE (OUTPATIENT)
Dept: GYNECOLOGIC ONCOLOGY | Facility: HOSPITAL | Age: 35
End: 2025-01-29
Payer: COMMERCIAL

## 2025-01-29 NOTE — TELEPHONE ENCOUNTER
I left a message for the patient that her pap results showed slightly abnormal cell that was likely due to radiation per her physician. I told her that her physician would like her to follow up in six months as scheduled. I told her to call the office with any questions.

## 2025-02-04 LAB
T4 FREE SERPL-MCNC: 1.3 NG/DL (ref 0.8–1.8)
TSH SERPL-ACNC: 6.27 MIU/L

## 2025-02-10 DIAGNOSIS — C53.9 MALIGNANT NEOPLASM OF CERVIX, UNSPECIFIED SITE (MULTI): ICD-10-CM

## 2025-02-10 DIAGNOSIS — N93.0 PCB (POST COITAL BLEEDING): Primary | ICD-10-CM

## 2025-02-10 RX ORDER — ESTRADIOL 0.1 MG/G
CREAM VAGINAL
Qty: 42.5 G | Refills: 2 | Status: SHIPPED | OUTPATIENT
Start: 2025-02-10

## 2025-02-10 RX ORDER — ESTRADIOL 0.1 MG/G
2 CREAM VAGINAL DAILY
COMMUNITY
End: 2025-02-10

## 2025-02-10 NOTE — TELEPHONE ENCOUNTER
Patient sent [employer] message requesting a prescription for estradiol cream as discussed at the time of last office visit with Dr. Alba.    Message forwarded to Dr. Alba and Chandni Hicks CNP.     Recommendation received and estradiol cream is recommended.   Estradiol pended to Chandni Hicks CNP

## 2025-02-12 ENCOUNTER — APPOINTMENT (OUTPATIENT)
Dept: ENDOCRINOLOGY | Facility: CLINIC | Age: 35
End: 2025-02-12
Payer: COMMERCIAL

## 2025-02-12 VITALS
RESPIRATION RATE: 16 BRPM | HEIGHT: 70 IN | WEIGHT: 219.8 LBS | HEART RATE: 62 BPM | BODY MASS INDEX: 31.47 KG/M2 | DIASTOLIC BLOOD PRESSURE: 70 MMHG | SYSTOLIC BLOOD PRESSURE: 112 MMHG

## 2025-02-12 DIAGNOSIS — E03.9 HYPOTHYROIDISM, UNSPECIFIED TYPE: ICD-10-CM

## 2025-02-12 DIAGNOSIS — E06.3 HYPOTHYROIDISM DUE TO HASHIMOTO THYROIDITIS: Primary | ICD-10-CM

## 2025-02-12 PROCEDURE — 1036F TOBACCO NON-USER: CPT | Performed by: INTERNAL MEDICINE

## 2025-02-12 PROCEDURE — 99213 OFFICE O/P EST LOW 20 MIN: CPT | Performed by: INTERNAL MEDICINE

## 2025-02-12 PROCEDURE — 3008F BODY MASS INDEX DOCD: CPT | Performed by: INTERNAL MEDICINE

## 2025-02-12 RX ORDER — LEVOTHYROXINE SODIUM 100 UG/1
100 TABLET ORAL DAILY
Qty: 90 TABLET | Refills: 0 | Status: SHIPPED | OUTPATIENT
Start: 2025-02-12 | End: 2025-02-14 | Stop reason: SDUPTHER

## 2025-02-12 RX ORDER — BISMUTH SUBSALICYLATE 262 MG
1 TABLET,CHEWABLE ORAL DAILY
COMMUNITY

## 2025-02-12 ASSESSMENT — ENCOUNTER SYMPTOMS
NAUSEA: 0
FATIGUE: 0
CHILLS: 0
HEADACHES: 0
DIARRHEA: 0
COUGH: 0
PALPITATIONS: 0
VOMITING: 0
FEVER: 0
SHORTNESS OF BREATH: 0

## 2025-02-12 NOTE — PROGRESS NOTES
Endocrinology: Follow up visit  Subjective   Patient ID: Clotilde Connor is a 34 y.o. female who presents for Hyperthyroidism.    PCP: Marii Martinez MD    HPI  Hypothyroidism s/p thyroiditis.  Taking t4 75 mcg since 9/2024   Initial lab check normal  Recent tsh up to 6.   A little more tired lately  No issues with the medication    Review of Systems   Constitutional:  Negative for chills, fatigue and fever.   Respiratory:  Negative for cough and shortness of breath.    Cardiovascular:  Negative for chest pain and palpitations.   Gastrointestinal:  Negative for diarrhea, nausea and vomiting.   Neurological:  Negative for headaches.       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 Before first use, prime pump. After use, clean tip and replace cap.    levothyroxine (SYNTHROID, LEVOXYL) 75 mcg, oral, Daily, Take  on an empty stomach at the same time each day, either 30 to 60 minutes prior to breakfast    multivitamin tablet 1 tablet, oral, Daily    omega-3 acid ethyl esters (LOVAZA) 1 g, Daily    progesterone (PROMETRIUM) 200 mg, oral, Daily, 12 days per month    vitamin E 400 Units, 2 times daily        Allergies   Allergen Reactions    Shrimp Hives and Swelling     shrimp        Objective   Vitals:    02/12/25 1152   BP: 112/70   Pulse: 62   Resp: 16      Vitals:    02/12/25 1152   Weight: 99.7 kg (219 lb 12.8 oz)      Body mass index is 31.82 kg/m².   Physical Exam  Constitutional:       Appearance: Normal appearance. She is overweight.   HENT:      Head: Normocephalic and atraumatic.   Neck:      Thyroid: No thyroid mass, thyromegaly or thyroid tenderness.   Cardiovascular:      Rate and Rhythm: Normal rate and regular rhythm.      Heart sounds: No murmur heard.     No gallop.   Pulmonary:      Effort: Pulmonary effort is normal.      Breath sounds: Normal breath sounds.   Abdominal:      Palpations: Abdomen is soft.      Comments: benign   Neurological:      General: No focal deficit present.      Mental Status: She is alert and oriented to person, place, and time.      Deep Tendon Reflexes: Reflexes are normal and symmetric.   Psychiatric:         Behavior: Behavior is cooperative.         Labs:  Lab Results   Component Value Date    HGBA1C 4.9 05/30/2024    TSH 6.27 (H) 02/03/2025    FREET4 1.3 02/03/2025      Lab Results   Component Value Date    THYROIDPAB 367 (H) 05/21/2024    TSI <1.0 05/21/2024        Assessment/Plan   Assessment & Plan  Hypothyroidism due to Hashimoto thyroiditis    Increase t4 to 100 mcg  Recheck labs in 2 months  Follow up in 6 months  Hypothyroidism, unspecified type    Orders:    levothyroxine (Synthroid, Levoxyl) 100 mcg tablet; Take 1 tablet (100 mcg) by mouth early in the morning.. Take on an empty stomach at the same time each day, either 30 to 60 minutes prior to  breakfast        Electronically signed by:  Neelam Gutierrez MD 02/12/25 12:04 PM

## 2025-02-12 NOTE — PATIENT INSTRUCTIONS
Increase thyroid med to 100 mcg  Recheck labs in 2 months  Follow up in 6 months  Call or message with concerns

## 2025-02-14 DIAGNOSIS — E03.9 HYPOTHYROIDISM, UNSPECIFIED TYPE: ICD-10-CM

## 2025-02-14 RX ORDER — LEVOTHYROXINE SODIUM 100 UG/1
100 TABLET ORAL DAILY
Qty: 90 TABLET | Refills: 0 | Status: SHIPPED | OUTPATIENT
Start: 2025-02-14 | End: 2026-02-14

## 2025-02-18 RX ORDER — LEVOTHYROXINE SODIUM 75 UG/1
TABLET ORAL
Qty: 90 TABLET | Refills: 0 | OUTPATIENT
Start: 2025-02-18

## 2025-03-24 DIAGNOSIS — C53.9 MALIGNANT NEOPLASM OF CERVIX, UNSPECIFIED SITE (MULTI): ICD-10-CM

## 2025-03-24 DIAGNOSIS — N93.0 PCB (POST COITAL BLEEDING): ICD-10-CM

## 2025-03-24 RX ORDER — ESTRADIOL 0.1 MG/G
CREAM VAGINAL
Qty: 42.5 G | Refills: 2 | Status: SHIPPED | OUTPATIENT
Start: 2025-03-24

## 2025-03-24 NOTE — TELEPHONE ENCOUNTER
Patient sent [employer] message requesting Estradiol cream be sent to [facility] mail order pharmacy.     Prescription pended to Chandni Hicks CNP to sent to [facility].

## 2025-05-13 ENCOUNTER — TELEPHONE (OUTPATIENT)
Dept: GYNECOLOGIC ONCOLOGY | Facility: HOSPITAL | Age: 35
End: 2025-05-13

## 2025-05-13 DIAGNOSIS — N93.0 PCB (POST COITAL BLEEDING): ICD-10-CM

## 2025-05-13 DIAGNOSIS — C53.9 MALIGNANT NEOPLASM OF CERVIX, UNSPECIFIED SITE (MULTI): ICD-10-CM

## 2025-05-13 RX ORDER — ESTRADIOL 0.1 MG/G
CREAM VAGINAL
Qty: 42.5 G | Refills: 2 | Status: SHIPPED | OUTPATIENT
Start: 2025-05-13

## 2025-05-16 PROCEDURE — RXMED WILLOW AMBULATORY MEDICATION CHARGE

## 2025-05-17 ENCOUNTER — PHARMACY VISIT (OUTPATIENT)
Dept: PHARMACY | Facility: CLINIC | Age: 35
End: 2025-05-17
Payer: COMMERCIAL

## 2025-05-20 DIAGNOSIS — E28.39 PRIMARY OVARIAN INSUFFICIENCY: ICD-10-CM

## 2025-05-20 RX ORDER — PROGESTERONE 200 MG/1
200 CAPSULE ORAL DAILY
Qty: 30 CAPSULE | Refills: 11 | Status: SHIPPED | OUTPATIENT
Start: 2025-05-20 | End: 2026-05-20

## 2025-05-20 NOTE — TELEPHONE ENCOUNTER
The patient called to have her Estradiol patches and her progesterone be refilled. I sent her request to her physician.

## 2025-05-27 DIAGNOSIS — E03.9 HYPOTHYROIDISM, UNSPECIFIED TYPE: ICD-10-CM

## 2025-05-27 RX ORDER — LEVOTHYROXINE SODIUM 100 UG/1
100 TABLET ORAL DAILY
Qty: 90 TABLET | Refills: 1 | Status: SHIPPED | OUTPATIENT
Start: 2025-05-27 | End: 2025-05-28 | Stop reason: SDUPTHER

## 2025-05-28 DIAGNOSIS — E03.9 HYPOTHYROIDISM, UNSPECIFIED TYPE: ICD-10-CM

## 2025-05-28 LAB
T4 FREE SERPL-MCNC: 1.3 NG/DL (ref 0.8–1.8)
TSH SERPL-ACNC: 6.29 MIU/L

## 2025-05-28 RX ORDER — LEVOTHYROXINE SODIUM 125 UG/1
125 TABLET ORAL DAILY
Qty: 90 TABLET | Refills: 1 | Status: SHIPPED | OUTPATIENT
Start: 2025-05-28 | End: 2025-05-28 | Stop reason: SDUPTHER

## 2025-05-28 RX ORDER — LEVOTHYROXINE SODIUM 125 UG/1
125 TABLET ORAL DAILY
Qty: 90 TABLET | Refills: 1 | Status: SHIPPED | OUTPATIENT
Start: 2025-05-28 | End: 2026-05-28

## 2025-05-29 ENCOUNTER — PHARMACY VISIT (OUTPATIENT)
Dept: PHARMACY | Facility: CLINIC | Age: 35
End: 2025-05-29
Payer: COMMERCIAL

## 2025-05-29 PROCEDURE — RXMED WILLOW AMBULATORY MEDICATION CHARGE

## 2025-06-18 ENCOUNTER — TELEPHONE (OUTPATIENT)
Dept: GYNECOLOGIC ONCOLOGY | Facility: HOSPITAL | Age: 35
End: 2025-06-18
Payer: COMMERCIAL

## 2025-06-18 DIAGNOSIS — E28.39 PRIMARY OVARIAN INSUFFICIENCY: ICD-10-CM

## 2025-06-18 RX ORDER — ESTRADIOL 0.1 MG/D
1 FILM, EXTENDED RELEASE TRANSDERMAL 2 TIMES WEEKLY
Qty: 8 PATCH | Refills: 11 | Status: SHIPPED | OUTPATIENT
Start: 2025-06-19 | End: 2026-06-19

## 2025-06-18 RX ORDER — ESTRADIOL 0.1 MG/D
1 FILM, EXTENDED RELEASE TRANSDERMAL 2 TIMES WEEKLY
Qty: 8 PATCH | Refills: 11 | Status: SHIPPED | OUTPATIENT
Start: 2025-06-19 | End: 2025-06-18 | Stop reason: SDUPTHER

## 2025-06-18 NOTE — TELEPHONE ENCOUNTER
Patient called requesting refill of estrogen patch.   Refill pended to provider.     [id number]  Notified patient that estrogen patch refill sent to [facility] pharmacy

## 2025-06-19 PROCEDURE — RXMED WILLOW AMBULATORY MEDICATION CHARGE

## 2025-06-20 ENCOUNTER — PHARMACY VISIT (OUTPATIENT)
Dept: PHARMACY | Facility: CLINIC | Age: 35
End: 2025-06-20
Payer: COMMERCIAL

## 2025-06-30 DIAGNOSIS — E03.9 HYPOTHYROIDISM, UNSPECIFIED TYPE: ICD-10-CM

## 2025-06-30 RX ORDER — LEVOTHYROXINE SODIUM 125 UG/1
125 TABLET ORAL DAILY
Qty: 30 TABLET | Refills: 0 | Status: SHIPPED | OUTPATIENT
Start: 2025-06-30 | End: 2025-07-30

## 2025-07-24 ENCOUNTER — OFFICE VISIT (OUTPATIENT)
Dept: GYNECOLOGIC ONCOLOGY | Facility: CLINIC | Age: 35
End: 2025-07-24
Payer: COMMERCIAL

## 2025-07-24 VITALS
OXYGEN SATURATION: 96 % | WEIGHT: 216 LBS | BODY MASS INDEX: 31.27 KG/M2 | TEMPERATURE: 97.9 F | HEART RATE: 74 BPM | SYSTOLIC BLOOD PRESSURE: 106 MMHG | RESPIRATION RATE: 20 BRPM | DIASTOLIC BLOOD PRESSURE: 72 MMHG

## 2025-07-24 DIAGNOSIS — Z08 ENCOUNTER FOR FOLLOW-UP SURVEILLANCE OF CERVICAL CANCER: ICD-10-CM

## 2025-07-24 DIAGNOSIS — Z85.41 ENCOUNTER FOR FOLLOW-UP SURVEILLANCE OF CERVICAL CANCER: ICD-10-CM

## 2025-07-24 DIAGNOSIS — N93.0 PCB (POST COITAL BLEEDING): ICD-10-CM

## 2025-07-24 DIAGNOSIS — C53.8 MALIGNANT NEOPLASM OF OVERLAPPING SITES OF CERVIX (MULTI): Primary | ICD-10-CM

## 2025-07-24 PROCEDURE — 1036F TOBACCO NON-USER: CPT | Performed by: STUDENT IN AN ORGANIZED HEALTH CARE EDUCATION/TRAINING PROGRAM

## 2025-07-24 PROCEDURE — 99214 OFFICE O/P EST MOD 30 MIN: CPT | Performed by: STUDENT IN AN ORGANIZED HEALTH CARE EDUCATION/TRAINING PROGRAM

## 2025-07-24 ASSESSMENT — PAIN SCALES - GENERAL: PAINLEVEL_OUTOF10: 0-NO PAIN

## 2025-07-24 NOTE — PROGRESS NOTES
Patient ID: Clotilde Connor is a 34 y.o. female.  Referring Physician: No referring provider defined for this encounter.  Primary Care Provider: Marii Martinez MD    Subjective    Interval History:  Reports intermittent spotting and she is applying her TV estrogen cream 2x weekly. She is going to the bathroom normally and eating and drinking normally denies lower extremity edema.     She denies fever, chills, chest pain, SOB, nausea, vomiting, diarrhea, constipation, dysuria, or any other concerning signs of symptoms.        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     PET/CT performed mid-cranium to midthighs on 3/31/2022:  No extracervical abnormal findings.        Underwent chemo-radiation therapy:  Dr. Rushing at the [facility]:  ChemoRT.  EBRT.  4500 cGy 25 fractions.  Dr. Erasmo Howe, Brachytherapy.  4 fractions, 2800 cGy.  Completed 7/8/2022.     No ovarian transposition.    Did have embryos harvested in [place] prior to chemoRT.    Past Medical History:   Diagnosis Date    Cervical cancer 2022    Hx antineoplastic chemo     PONV (postoperative nausea and vomiting)      Past Surgical History:   Procedure Laterality Date    BRACHIAL ARTERY BYPASS W/ OR W/O VEIN GRAFT      CERVICAL BIOPSY      ECTOPIC PREGNANCY SURGERY      OVARY SURGERY           Objective    BSA: 2.2 meters squared  /72 (BP Location: Right arm, Patient Position: Sitting, BP Cuff Size: Adult)   Pulse 74   Temp 36.6 °C (97.9 °F) (Core)   Resp 20   Wt 98 kg (216 lb)   LMP 07/11/2025   SpO2 96%   BMI 31.27 kg/m²      Physical Exam  Constitutional:       Appearance: Normal appearance. She is normal weight.   HENT:      Head: Normocephalic.      Mouth/Throat:      Mouth: Mucous membranes are moist.     Eyes:      Pupils: Pupils are equal, round, and reactive to light.       Cardiovascular:      Rate and Rhythm: Normal  "rate and regular rhythm.      Heart sounds: No murmur heard.     No friction rub. No gallop.   Pulmonary:      Effort: Pulmonary effort is normal.      Breath sounds: Normal breath sounds.   Abdominal:      General: Abdomen is flat. Bowel sounds are normal.      Palpations: Abdomen is soft.   Genitourinary:     Comments: Normal external female genitalia without lesions or masses  Speculum exam: Smooth vagina without lesions or masses, cervix with radiation changes, no lesions or masses  Bimanual exam: smooth vagina and cervix without lesions or masses, normal uterus, parametria         Musculoskeletal:         General: No swelling.     Skin:     General: Skin is warm and dry.     Neurological:      Mental Status: She is alert.         Performance Status:  Asymptomatic    Assessment/Plan     Oncology History Overview Note   3/7/2022 Excisional procedure  Final pathology:  2.6 x 2.3 x 2.0 cm specimen submitted.  Invasive carcinoma diagnosed,  most consistent with adenosquamous carcinoma.  Tumor measures 2.6 cm grossly, with \"circumferential involvement of the cone specimen\".  Depth of invasion is at least 8 mm.  Perineural invasion noted as were findings suspicious for LVSI.    ECC:  Non-diagnostic.  3/31/2022 PET/CT No extracervical abnormal findings.      Reported \"indistinct 3.6  x 4.2 cm enhancement and thickening with associated FDG uptake.    ChemoRT.  EBRT.  4500 cGy 25 fractions. in [facility].   Completed 6/2022  - 7/8/2022  HDR BT 4 fractions, 2800 cGy  3/13/23 EUA cervical biopsy, ECC radiation changes, no evidence of malignancy     Malignant neoplasm of cervix (Multi)   4/12/2022 Initial Diagnosis    Malignant neoplasm of cervix (Multi)     34 y.o.  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  Completed 7/8/2022.  MELISSA 3 years.     # Cervical cancer  - No evidence of recurrence by exam or symptoms  - Signs/ sxs of " recurrence reviewed  - Surveillance visit in 6 months       Scribe Attestation  By signing my name below, I, Padma Wade   attest that this documentation has been prepared under the direction and in the presence of Mahi Alba MD, MS.     Provider Attestation - Scribe documentation    All medical record entries made by the Scribe were at my direction and personally dictated by me. I have reviewed the chart and agree that the record accurately reflects my personal performance of the history, physical exam, discussion and plan.    Mahi Alba MD, MS

## 2025-08-08 DIAGNOSIS — E03.9 HYPOTHYROIDISM, UNSPECIFIED TYPE: ICD-10-CM

## 2025-08-08 PROCEDURE — RXMED WILLOW AMBULATORY MEDICATION CHARGE

## 2025-08-08 RX ORDER — LEVOTHYROXINE SODIUM 125 UG/1
125 TABLET ORAL DAILY
Qty: 90 TABLET | Refills: 1 | Status: SHIPPED | OUTPATIENT
Start: 2025-08-08 | End: 2025-08-08 | Stop reason: SDUPTHER

## 2025-08-08 RX ORDER — LEVOTHYROXINE SODIUM 125 UG/1
125 TABLET ORAL DAILY
Qty: 90 TABLET | Refills: 3 | Status: SHIPPED | OUTPATIENT
Start: 2025-08-08 | End: 2026-08-08

## 2025-08-11 ENCOUNTER — PHARMACY VISIT (OUTPATIENT)
Dept: PHARMACY | Facility: CLINIC | Age: 35
End: 2025-08-11
Payer: COMMERCIAL